# Patient Record
Sex: MALE | NOT HISPANIC OR LATINO | Employment: OTHER | URBAN - NONMETROPOLITAN AREA
[De-identification: names, ages, dates, MRNs, and addresses within clinical notes are randomized per-mention and may not be internally consistent; named-entity substitution may affect disease eponyms.]

---
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
## 2019-06-03 ENCOUNTER — HOSPITAL ENCOUNTER (EMERGENCY)
Facility: HOSPITAL | Age: 19
Discharge: HOME | End: 2019-06-03
Attending: EMERGENCY MEDICINE | Admitting: EMERGENCY MEDICINE

## 2019-06-03 VITALS
RESPIRATION RATE: 19 BRPM | SYSTOLIC BLOOD PRESSURE: 144 MMHG | DIASTOLIC BLOOD PRESSURE: 61 MMHG | TEMPERATURE: 98.2 F | OXYGEN SATURATION: 99 % | HEIGHT: 63 IN | BODY MASS INDEX: 30.83 KG/M2 | HEART RATE: 75 BPM | WEIGHT: 174 LBS

## 2019-06-03 DIAGNOSIS — Z20.2 POTENTIAL EXPOSURE TO STD: Primary | ICD-10-CM

## 2019-06-03 LAB
BILIRUB UR QL STRIP: NEGATIVE
C TRACH RRNA CVX QL NAA+PROBE: NOT DETECTED
CLARITY UR: CLEAR
COLOR UR: YELLOW
GLUCOSE UR STRIP-MCNC: NEGATIVE MG/DL
HAV IGM SERPL QL IA: NORMAL
HBV CORE IGM SERPL QL IA: NORMAL
HBV SURFACE AG SERPL QL IA: NORMAL
HCV AB SER DONR QL: NORMAL
HGB UR QL STRIP.AUTO: NEGATIVE
HIV1+2 AB SER QL: NORMAL
KETONES UR QL STRIP: NEGATIVE
LEUKOCYTE ESTERASE UR QL STRIP.AUTO: NEGATIVE
N GONORRHOEA RRNA SPEC QL NAA+PROBE: NOT DETECTED
NITRITE UR QL STRIP: NEGATIVE
PH UR STRIP.AUTO: 6 [PH] (ref 5–8)
PROT UR QL STRIP: NEGATIVE
SP GR UR STRIP: 1.02 (ref 1–1.03)
UROBILINOGEN UR QL STRIP: NORMAL

## 2019-06-03 PROCEDURE — 87255 GENET VIRUS ISOLATE HSV: CPT | Performed by: PHYSICIAN ASSISTANT

## 2019-06-03 PROCEDURE — 96372 THER/PROPH/DIAG INJ SC/IM: CPT

## 2019-06-03 PROCEDURE — 25010000002 CEFTRIAXONE PER 250 MG: Performed by: PHYSICIAN ASSISTANT

## 2019-06-03 PROCEDURE — 80074 ACUTE HEPATITIS PANEL: CPT | Performed by: PHYSICIAN ASSISTANT

## 2019-06-03 PROCEDURE — 36415 COLL VENOUS BLD VENIPUNCTURE: CPT

## 2019-06-03 PROCEDURE — 87591 N.GONORRHOEAE DNA AMP PROB: CPT | Performed by: PHYSICIAN ASSISTANT

## 2019-06-03 PROCEDURE — 99283 EMERGENCY DEPT VISIT LOW MDM: CPT

## 2019-06-03 PROCEDURE — G0432 EIA HIV-1/HIV-2 SCREEN: HCPCS | Performed by: PHYSICIAN ASSISTANT

## 2019-06-03 PROCEDURE — 81003 URINALYSIS AUTO W/O SCOPE: CPT | Performed by: PHYSICIAN ASSISTANT

## 2019-06-03 PROCEDURE — 87491 CHLMYD TRACH DNA AMP PROBE: CPT | Performed by: PHYSICIAN ASSISTANT

## 2019-06-03 RX ORDER — CEFTRIAXONE 1 G/1
1000 INJECTION, POWDER, FOR SOLUTION INTRAMUSCULAR; INTRAVENOUS ONCE
Status: COMPLETED | OUTPATIENT
Start: 2019-06-03 | End: 2019-06-03

## 2019-06-03 RX ORDER — LIDOCAINE HYDROCHLORIDE 10 MG/ML
2.1 INJECTION, SOLUTION EPIDURAL; INFILTRATION; INTRACAUDAL; PERINEURAL ONCE
Status: COMPLETED | OUTPATIENT
Start: 2019-06-03 | End: 2019-06-03

## 2019-06-03 RX ORDER — AZITHROMYCIN 250 MG/1
1000 TABLET, FILM COATED ORAL ONCE
Status: COMPLETED | OUTPATIENT
Start: 2019-06-03 | End: 2019-06-03

## 2019-06-03 RX ADMIN — AZITHROMYCIN 1000 MG: 250 TABLET, FILM COATED ORAL at 19:05

## 2019-06-03 RX ADMIN — CEFTRIAXONE 1000 MG: 1 INJECTION, POWDER, FOR SOLUTION INTRAMUSCULAR; INTRAVENOUS at 19:05

## 2019-06-03 RX ADMIN — LIDOCAINE HYDROCHLORIDE 2.1 ML: 10 INJECTION, SOLUTION EPIDURAL; INFILTRATION; INTRACAUDAL; PERINEURAL at 19:06

## 2019-06-03 NOTE — DISCHARGE INSTRUCTIONS
Call one of the offices below to establish a primary care provider.  If you are unable to get an appointment and feel it is an emergency and need to be seen immediately please return to the Emergency Department.    Call one of the office below to set up a primary care provider.    Dr. Lopez Arnold                                                                                                       [address] [facility] [address]  [phone]    Dr. Do, Dr. KAILEY Reeves, Dr. NATASHA Reeves ([facility])  [address] [facility] [address]  [phone]    Dr. Locke, Dr. Restrepo, Dr. Jean (Critical access hospital)  [address] [facility] [address]  [phone]    Dr. Hernandez  [address] [facility] [address]  [phone]    Dr. Lazaro, Dr. North, Dr. Mendez, Dr. Molina ([facility])  [address] [place] [address] [address]  [facility] [address]  [phone]    Dr. Luci Kat  [address] [facility] [place] [address]  [phone]    Dr. Marnie Glynn  [address]   [address]  [facility] [address]  [phone]    Dr. Arnold  [address] [facility] [address]  [phone]    Dr. Gentile, Dr. Hernandez  [address] [facility] [place] [address]  [phone]    Dr. Uribe  [address]                                                              [address]  [facility] [address]  [phone]    Dr. Whitfield  [address] [facility] [address] [phone]    Dr. Jaylin Rodrigues  [address]  [address]  [facility] [address]  [phone]

## 2019-06-03 NOTE — ED PROVIDER NOTES
"Subjective   18-year-old male presents to the ER requesting to be checked for \"everything\" STD related.  He is currently asymptomatic, he has no dysuria, penile discharge, genital lesions, abdominal pain, fever, chills.  He states he is with his girlfriend, and has not been with anybody else.  He states he has had hepatitis A in the past, but denies any other past history of hepatitis he states he brought his son in to be seen for URI symptoms, and wanted to have blood work done while he was here.        History provided by:  Patient   used: No    Illness   Severity:  Mild  Onset quality:  Unable to specify  Timing:  Unable to specify  Progression:  Unable to specify  Associated symptoms: no abdominal pain, no chest pain, no congestion, no cough, no diarrhea, no fever, no headaches, no myalgias, no nausea, no rash, no rhinorrhea, no shortness of breath, no sore throat, no vomiting and no wheezing        Review of Systems   Constitutional: Negative for activity change and fever.   HENT: Negative for congestion, rhinorrhea and sore throat.    Eyes: Negative for pain and redness.   Respiratory: Negative for cough, shortness of breath and wheezing.    Cardiovascular: Negative for chest pain.   Gastrointestinal: Negative for abdominal distention, abdominal pain, diarrhea, nausea and vomiting.   Endocrine: Negative for polyphagia and polyuria.   Genitourinary: Negative for decreased urine volume, difficulty urinating and dysuria.   Musculoskeletal: Negative for arthralgias, myalgias and neck pain.   Skin: Negative for rash and wound.   Allergic/Immunologic: Negative for food allergies and immunocompromised state.   Neurological: Negative for dizziness and headaches.   Hematological: Negative for adenopathy. Does not bruise/bleed easily.   Psychiatric/Behavioral: Negative for agitation and confusion.   All other systems reviewed and are negative.      Past Medical History:   Diagnosis Date   • ADHD    • " "Learning disability    • Seizures (CMS/HCC)     last seizure \"a long time ago, years.\"   • Self-injurious behavior     \"I tried to cut myself for attention when I was a teenager.\"       No Known Allergies    Past Surgical History:   Procedure Laterality Date   • NO PAST SURGERIES         Family History   Family history unknown: Yes       Social History     Socioeconomic History   • Marital status: Single     Spouse name: Not on file   • Number of children: Not on file   • Years of education: Not on file   • Highest education level: Not on file   Tobacco Use   • Smoking status: Current Every Day Smoker     Packs/day: 1.00     Years: 0.50     Pack years: 0.50     Types: Cigarettes   • Smokeless tobacco: Former User   Substance and Sexual Activity   • Alcohol use: No   • Drug use: No   • Sexual activity: Yes     Partners: Female     Birth control/protection: None           Objective   Physical Exam   Constitutional: He is oriented to person, place, and time. He appears well-developed and well-nourished.   HENT:   Head: Normocephalic and atraumatic.   Eyes: EOM are normal. Pupils are equal, round, and reactive to light.   Neck: Normal range of motion. Neck supple.   Cardiovascular: Normal rate, regular rhythm and normal heart sounds.   Pulmonary/Chest: Effort normal and breath sounds normal.   Abdominal: Soft. Bowel sounds are normal.   Genitourinary: Testes normal and penis normal. Right testis shows no tenderness. Left testis shows no tenderness. No penile erythema or penile tenderness. No discharge found.   Musculoskeletal: Normal range of motion.   Neurological: He is alert and oriented to person, place, and time.   Skin: Skin is warm and dry.   Psychiatric: He has a normal mood and affect. His behavior is normal. Judgment and thought content normal.   Nursing note and vitals reviewed.      Procedures           ED Course  ED Course as of Jun 03 1925 Mon Jun 03, 2019   1808 Patient states he wants tested for " "\"everything\".  He states he is completely asymptomatic at this time, but is still requesting to be tested for gonorrhea, chlamydia, herpes, hepatitis, HIV.  I will prophylactically treat patient for gonorrhea and chlamydia, he will have to call back up here for his test results, and he is aware of this.  [ABRAM]      ED Course User Index  [BARAM] Quinton Hess PA                  MDM  Number of Diagnoses or Management Options     Amount and/or Complexity of Data Reviewed  Clinical lab tests: ordered and reviewed  Tests in the radiology section of CPT®: reviewed and ordered  Tests in the medicine section of CPT®: reviewed and ordered    Patient Progress  Patient progress: stable        Final diagnoses:   Potential exposure to STD            Quinton Hess PA  06/03/19 1925    "

## 2019-06-06 LAB — HSV SPEC CULT: NEGATIVE

## 2019-07-31 ENCOUNTER — APPOINTMENT (OUTPATIENT)
Dept: CT IMAGING | Facility: HOSPITAL | Age: 19
End: 2019-07-31

## 2019-07-31 ENCOUNTER — HOSPITAL ENCOUNTER (EMERGENCY)
Facility: HOSPITAL | Age: 19
Discharge: ANOTHER HOSPITAL | End: 2019-08-01
Attending: FAMILY MEDICINE | Admitting: EMERGENCY MEDICINE

## 2019-07-31 DIAGNOSIS — G40.901 STATUS EPILEPTICUS (HCC): Primary | ICD-10-CM

## 2019-07-31 LAB
6-ACETYL MORPHINE: NEGATIVE
ALBUMIN SERPL-MCNC: 4.4 G/DL (ref 3.5–5.2)
ALBUMIN/GLOB SERPL: 1.7 G/DL
ALP SERPL-CCNC: 85 U/L (ref 39–117)
ALT SERPL W P-5'-P-CCNC: 49 U/L (ref 1–41)
AMPHET+METHAMPHET UR QL: NEGATIVE
ANION GAP SERPL CALCULATED.3IONS-SCNC: 10.7 MMOL/L (ref 5–15)
APAP SERPL-MCNC: <5 MCG/ML (ref 10–30)
AST SERPL-CCNC: 34 U/L (ref 1–40)
BARBITURATES UR QL SCN: NEGATIVE
BASOPHILS # BLD AUTO: 0.01 10*3/MM3 (ref 0–0.2)
BASOPHILS NFR BLD AUTO: 0.1 % (ref 0–1.5)
BENZODIAZ UR QL SCN: NEGATIVE
BILIRUB SERPL-MCNC: 0.4 MG/DL (ref 0.2–1.2)
BUN BLD-MCNC: 14 MG/DL (ref 6–20)
BUN/CREAT SERPL: 20.9 (ref 7–25)
BUPRENORPHINE SERPL-MCNC: NEGATIVE NG/ML
CALCIUM SPEC-SCNC: 9.3 MG/DL (ref 8.6–10.5)
CANNABINOIDS SERPL QL: NEGATIVE
CHLORIDE SERPL-SCNC: 104 MMOL/L (ref 98–107)
CK SERPL-CCNC: 245 U/L (ref 20–200)
CO2 SERPL-SCNC: 25.3 MMOL/L (ref 22–29)
COCAINE UR QL: NEGATIVE
CREAT BLD-MCNC: 0.67 MG/DL (ref 0.76–1.27)
D-LACTATE SERPL-SCNC: 1 MMOL/L (ref 0.5–2)
DEPRECATED RDW RBC AUTO: 39 FL (ref 37–54)
EOSINOPHIL # BLD AUTO: 0.26 10*3/MM3 (ref 0–0.4)
EOSINOPHIL NFR BLD AUTO: 3.2 % (ref 0.3–6.2)
ERYTHROCYTE [DISTWIDTH] IN BLOOD BY AUTOMATED COUNT: 12.3 % (ref 12.3–15.4)
ETHANOL BLD-MCNC: <10 MG/DL (ref 0–10)
ETHANOL UR QL: <0.01 %
GFR SERPL CREATININE-BSD FRML MDRD: >150 ML/MIN/1.73
GLOBULIN UR ELPH-MCNC: 2.6 GM/DL
GLUCOSE BLD-MCNC: 110 MG/DL (ref 65–99)
GLUCOSE BLDC GLUCOMTR-MCNC: 115 MG/DL (ref 70–130)
HCT VFR BLD AUTO: 42.1 % (ref 37.5–51)
HGB BLD-MCNC: 14.6 G/DL (ref 13–17.7)
HOLD SPECIMEN: NORMAL
HOLD SPECIMEN: NORMAL
IMM GRANULOCYTES # BLD AUTO: 0.01 10*3/MM3 (ref 0–0.05)
IMM GRANULOCYTES NFR BLD AUTO: 0.1 % (ref 0–0.5)
INR PPP: 1.11 (ref 0.9–1.1)
LYMPHOCYTES # BLD AUTO: 2.62 10*3/MM3 (ref 0.7–3.1)
LYMPHOCYTES NFR BLD AUTO: 32.6 % (ref 19.6–45.3)
MCH RBC QN AUTO: 30.4 PG (ref 26.6–33)
MCHC RBC AUTO-ENTMCNC: 34.7 G/DL (ref 31.5–35.7)
MCV RBC AUTO: 87.7 FL (ref 79–97)
METHADONE UR QL SCN: NEGATIVE
MONOCYTES # BLD AUTO: 0.92 10*3/MM3 (ref 0.1–0.9)
MONOCYTES NFR BLD AUTO: 11.5 % (ref 5–12)
MYOGLOBIN SERPL-MCNC: 25.6 NG/ML (ref 28–72)
NEUTROPHILS # BLD AUTO: 4.21 10*3/MM3 (ref 1.7–7)
NEUTROPHILS NFR BLD AUTO: 52.5 % (ref 42.7–76)
OPIATES UR QL: NEGATIVE
OXYCODONE UR QL SCN: NEGATIVE
PCP UR QL SCN: NEGATIVE
PLATELET # BLD AUTO: 238 10*3/MM3 (ref 140–450)
PMV BLD AUTO: 9.5 FL (ref 6–12)
POTASSIUM BLD-SCNC: 3.7 MMOL/L (ref 3.5–5.2)
PROT SERPL-MCNC: 7 G/DL (ref 6–8.5)
PROTHROMBIN TIME: 14.9 SECONDS (ref 11–15.4)
RBC # BLD AUTO: 4.8 10*6/MM3 (ref 4.14–5.8)
SALICYLATES SERPL-MCNC: <0.3 MG/DL
SODIUM BLD-SCNC: 140 MMOL/L (ref 136–145)
WBC NRBC COR # BLD: 8.03 10*3/MM3 (ref 3.4–10.8)
WHOLE BLOOD HOLD SPECIMEN: NORMAL
WHOLE BLOOD HOLD SPECIMEN: NORMAL

## 2019-07-31 PROCEDURE — 96374 THER/PROPH/DIAG INJ IV PUSH: CPT

## 2019-07-31 PROCEDURE — 80307 DRUG TEST PRSMV CHEM ANLYZR: CPT | Performed by: FAMILY MEDICINE

## 2019-07-31 PROCEDURE — 25010000002 LEVETRIRACETAM PER 10 MG: Performed by: FAMILY MEDICINE

## 2019-07-31 PROCEDURE — 83605 ASSAY OF LACTIC ACID: CPT | Performed by: FAMILY MEDICINE

## 2019-07-31 PROCEDURE — 83874 ASSAY OF MYOGLOBIN: CPT | Performed by: FAMILY MEDICINE

## 2019-07-31 PROCEDURE — 82550 ASSAY OF CK (CPK): CPT | Performed by: FAMILY MEDICINE

## 2019-07-31 PROCEDURE — 80053 COMPREHEN METABOLIC PANEL: CPT | Performed by: FAMILY MEDICINE

## 2019-07-31 PROCEDURE — 85025 COMPLETE CBC W/AUTO DIFF WBC: CPT | Performed by: FAMILY MEDICINE

## 2019-07-31 PROCEDURE — 82962 GLUCOSE BLOOD TEST: CPT

## 2019-07-31 PROCEDURE — 85610 PROTHROMBIN TIME: CPT | Performed by: FAMILY MEDICINE

## 2019-07-31 PROCEDURE — 70450 CT HEAD/BRAIN W/O DYE: CPT | Performed by: RADIOLOGY

## 2019-07-31 PROCEDURE — 80177 DRUG SCRN QUAN LEVETIRACETAM: CPT | Performed by: FAMILY MEDICINE

## 2019-07-31 PROCEDURE — 99284 EMERGENCY DEPT VISIT MOD MDM: CPT

## 2019-07-31 PROCEDURE — 70450 CT HEAD/BRAIN W/O DYE: CPT

## 2019-07-31 RX ORDER — SODIUM CHLORIDE 0.9 % (FLUSH) 0.9 %
10 SYRINGE (ML) INJECTION AS NEEDED
Status: DISCONTINUED | OUTPATIENT
Start: 2019-07-31 | End: 2019-08-01 | Stop reason: HOSPADM

## 2019-07-31 RX ADMIN — SODIUM CHLORIDE 1000 MG: 9 INJECTION, SOLUTION INTRAVENOUS at 22:20

## 2019-08-01 VITALS
TEMPERATURE: 98.3 F | SYSTOLIC BLOOD PRESSURE: 129 MMHG | HEART RATE: 98 BPM | DIASTOLIC BLOOD PRESSURE: 73 MMHG | OXYGEN SATURATION: 97 % | HEIGHT: 66 IN | RESPIRATION RATE: 18 BRPM | WEIGHT: 165 LBS | BODY MASS INDEX: 26.52 KG/M2

## 2019-08-01 NOTE — ED NOTES
I called [facility] to request transport to [facility]. They are going to contact [facility] and call me back.     Edith Edwards  08/01/19 0143

## 2019-08-01 NOTE — ED NOTES
Called radiology and requested a disc from [place] for transfer.      Edith Edwards  08/01/19 0122

## 2019-08-01 NOTE — ED NOTES
Called [facility] to give report, Nurse was in an isolation room and will return call soon.      Paige Hansen, RN  08/01/19 0145

## 2019-08-01 NOTE — ED NOTES
Called Christine at [facility] and she is paging Dr Boyd, nuerologist at [facility] for Dr Vital.     Edith Edwards  07/31/19 7814

## 2019-08-01 NOTE — ED NOTES
Called Christine back at [facility] to see if they reached Dr Boyd. She said they have made several attempts and cannot reach him.      Edith Edwards  08/01/19 0029

## 2019-08-01 NOTE — ED PROVIDER NOTES
"Subjective   19-year-old male with a history of seizures presents the emergency room with seizure activity that occurred just prior to arrival.  Per EMS patient was noted to have a seizure that lasted approximately 20 minutes per bystanders.  Patient had subsequent resolution of seizure activity.  Patient states that he is not followed by anyone for seizures.  He states that he is on Keppra but reports he has not taken medication in the past few days.  He is unsure when the exact time he took his Keppra last.  He denies fever chills headache nausea vomiting chest pain shortness of breath.  Patient states that he feels fatigued.  He denies any other symptoms at this time.        Seizures   Seizure type:  Grand mal  Episode characteristics: generalized shaking    Postictal symptoms: confusion    Duration:  20 minutes  Timing:  Once  Context: medical non-compliance    Context: not fever, not possible hypoglycemia, not previous head injury and not stress    Recent head injury:  No recent head injuries      Review of Systems   Constitutional: Positive for fatigue. Negative for diaphoresis and fever.   HENT: Negative for sore throat.    Respiratory: Negative for cough, shortness of breath and wheezing.    Cardiovascular: Negative for chest pain and palpitations.   Gastrointestinal: Negative for abdominal pain, diarrhea, nausea and vomiting.   Genitourinary: Negative for flank pain.   Musculoskeletal: Negative for arthralgias and myalgias.   Skin: Negative for rash.   Neurological: Positive for seizures. Negative for dizziness and headaches.   All other systems reviewed and are negative.      Past Medical History:   Diagnosis Date   • ADHD    • Learning disability    • Seizures (CMS/HCC)     last seizure \"a long time ago, years.\"   • Self-injurious behavior     \"I tried to cut myself for attention when I was a teenager.\"       No Known Allergies    Past Surgical History:   Procedure Laterality Date   • NO PAST SURGERIES   "       Family History   Family history unknown: Yes       Social History     Socioeconomic History   • Marital status: Single     Spouse name: Not on file   • Number of children: Not on file   • Years of education: Not on file   • Highest education level: Not on file   Tobacco Use   • Smoking status: Current Every Day Smoker     Packs/day: 1.00     Years: 0.50     Pack years: 0.50     Types: Cigarettes   • Smokeless tobacco: Former User   Substance and Sexual Activity   • Alcohol use: No   • Drug use: No   • Sexual activity: Yes     Partners: Female     Birth control/protection: None           Objective   Physical Exam   Constitutional: He is oriented to person, place, and time.   Eyes: Conjunctivae, EOM and lids are normal. Right eye exhibits no nystagmus. Left eye exhibits no nystagmus.   Neck: Normal range of motion. Neck supple. No JVD present.   No meningismus   Cardiovascular:   Tachycardic.   2+ radial pulses 2+ fast pedis pulses 2+ posterior tibialis pulse bilaterally no extra systoles   Pulmonary/Chest: Effort normal and breath sounds normal. He has no wheezes. He has no rales.   No accessory muscle use.   Abdominal: Soft. Bowel sounds are normal. There is no tenderness. There is no rebound and no guarding.   Musculoskeletal: Normal range of motion. He exhibits no edema or tenderness.   Neurological: He is alert and oriented to person, place, and time. He has normal strength. No cranial nerve deficit or sensory deficit.   Reflex Scores:       Patellar reflexes are 2+ on the right side and 2+ on the left side.       Achilles reflexes are 2+ on the right side and 2+ on the left side.  Fine resting tremor.   Skin: Skin is warm. He is not diaphoretic. No pallor.   Psychiatric: He has a normal mood and affect.       Procedures           ED Course  ED Course as of Aug 05 1256   Wed Jul 31, 2019   2208 Patient was noted to have reported history of seizures.  Patient has not had any seizure activity while in the  emergency room.  Patient does report having 20 minutes of seizure activity prior to coming to the emergency room patient reports he has missed medications recently he is unsure when his last dose of Keppra was given.  Have given IV Keppra 1 g.  [BB]   2247 Patient without seizure activity during emergency room stay.  Patient's wife does state that patient has been without his seizure medication for 3 months.  She states that patient seizure today was a generalized shaking.  Episode lasted 15 to 20 minutes.  [BB]   2343 Patient CT scan of head is unremarkable.  Patient noted to be oriented x3.  Patient alert and awake.  Tremor has resolved.  Patient case discussed with Dr. Hamilton at [facility] who states that given new onset seizure and length of duration patient would likely need EEG.  Have therefore called [facility] and awaiting callback.  [BB]   Thu Aug 01, 2019   0029 Have contacted [facility] who states that patient be placed on a wait list.  Have contacted [facility] awaiting callback.  Have contacted [facility] who states no neurology coverage.  We will continue to monitor patient.  [BB]   0041 Have spoken to neurologist at  who is unable to accept transfer due to being on diversion.  [BB]   0042 Have discussed case with Dr. PERRY who has assumed care  [BB]   0102 D/W  Dr Boyd accepting to see in consult, will have hospitalist consult us for transfer  [TZ]      ED Course User Index  [BB] Teo Vital MD  [TZ] Austen Jackson MD                  [facility]        Final diagnoses:   Status epilepticus (CMS/[facility])            Teo Vital MD  08/05/19 1549

## 2019-08-01 NOTE — ED NOTES
On hold with Christine from [facility]. She is getting the neurologist at [facility] for Dr. Vital.     Edith Edwards  07/31/19 2744

## 2019-08-01 NOTE — ED NOTES
Called [facility] and spoke to Angelica. She said patient would have to go on a waiting list.      Edith Edwards  07/31/19 0339

## 2019-08-01 NOTE — ED NOTES
Bedside report given to [facility] at this time. Patient is A/O x4, leaving with 20 gauge IV in place in left hand, respirations even/unlabored. VSS/NADN. EMTALA and other discharge documentation given to EMS. Patient belongings with patient's wife.      Paige Hansen RN  08/01/19 6187

## 2019-08-04 LAB — LEVETIRACETAM SERPL-MCNC: NORMAL UG/ML (ref 10–40)

## 2021-03-16 ENCOUNTER — BULK ORDERING (OUTPATIENT)
Dept: CASE MANAGEMENT | Facility: OTHER | Age: 21
End: 2021-03-16

## 2021-03-16 DIAGNOSIS — Z23 IMMUNIZATION DUE: ICD-10-CM

## 2022-08-06 ENCOUNTER — APPOINTMENT (OUTPATIENT)
Dept: CT IMAGING | Facility: HOSPITAL | Age: 22
End: 2022-08-06

## 2022-08-06 ENCOUNTER — HOSPITAL ENCOUNTER (EMERGENCY)
Facility: HOSPITAL | Age: 22
Discharge: HOME | End: 2022-08-06
Attending: EMERGENCY MEDICINE | Admitting: STUDENT IN AN ORGANIZED HEALTH CARE EDUCATION/TRAINING PROGRAM

## 2022-08-06 VITALS
OXYGEN SATURATION: 98 % | HEIGHT: 63 IN | RESPIRATION RATE: 18 BRPM | BODY MASS INDEX: 31.89 KG/M2 | WEIGHT: 180 LBS | HEART RATE: 87 BPM | DIASTOLIC BLOOD PRESSURE: 70 MMHG | TEMPERATURE: 98 F | SYSTOLIC BLOOD PRESSURE: 137 MMHG

## 2022-08-06 DIAGNOSIS — S29.019A STRAIN OF THORACIC REGION, INITIAL ENCOUNTER: ICD-10-CM

## 2022-08-06 DIAGNOSIS — S16.1XXA STRAIN OF NECK MUSCLE, INITIAL ENCOUNTER: Primary | ICD-10-CM

## 2022-08-06 PROCEDURE — 72128 CT CHEST SPINE W/O DYE: CPT

## 2022-08-06 PROCEDURE — 72125 CT NECK SPINE W/O DYE: CPT

## 2022-08-06 PROCEDURE — 72131 CT LUMBAR SPINE W/O DYE: CPT

## 2022-08-06 PROCEDURE — 99282 EMERGENCY DEPT VISIT SF MDM: CPT
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
## 2023-05-06 ENCOUNTER — HOSPITAL ENCOUNTER (EMERGENCY)
Facility: HOSPITAL | Age: 23
Discharge: HOME | End: 2023-05-06
Attending: STUDENT IN AN ORGANIZED HEALTH CARE EDUCATION/TRAINING PROGRAM
Payer: COMMERCIAL

## 2023-05-06 VITALS
RESPIRATION RATE: 18 BRPM | OXYGEN SATURATION: 95 % | WEIGHT: 200 LBS | DIASTOLIC BLOOD PRESSURE: 64 MMHG | BODY MASS INDEX: 35.44 KG/M2 | TEMPERATURE: 98 F | HEIGHT: 63 IN | HEART RATE: 92 BPM | SYSTOLIC BLOOD PRESSURE: 120 MMHG

## 2023-05-06 DIAGNOSIS — S61.012A LACERATION OF LEFT THUMB WITHOUT FOREIGN BODY WITHOUT DAMAGE TO NAIL, INITIAL ENCOUNTER: Primary | ICD-10-CM

## 2023-05-06 PROCEDURE — 99282 EMERGENCY DEPT VISIT SF MDM: CPT

## 2023-05-06 PROCEDURE — 25010000002 TETANUS-DIPHTH-ACELL PERTUSSIS 5-2.5-18.5 LF-MCG/0.5 SUSPENSION PREFILLED SYRINGE: Performed by: PHYSICIAN ASSISTANT

## 2023-05-06 PROCEDURE — 90471 IMMUNIZATION ADMIN: CPT | Performed by: PHYSICIAN ASSISTANT

## 2023-05-06 PROCEDURE — 90715 TDAP VACCINE 7 YRS/> IM: CPT | Performed by: PHYSICIAN ASSISTANT

## 2023-05-06 RX ORDER — IBUPROFEN 800 MG/1
800 TABLET ORAL EVERY 6 HOURS PRN
Qty: 30 TABLET | Refills: 0 | Status: SHIPPED | OUTPATIENT
Start: 2023-05-06

## 2023-05-06 RX ORDER — LIDOCAINE HYDROCHLORIDE 10 MG/ML
10 INJECTION, SOLUTION EPIDURAL; INFILTRATION; INTRACAUDAL; PERINEURAL ONCE
Status: COMPLETED | OUTPATIENT
Start: 2023-05-06 | End: 2023-05-06

## 2023-05-06 RX ORDER — CEPHALEXIN 500 MG/1
500 CAPSULE ORAL 2 TIMES DAILY
Qty: 20 CAPSULE | Refills: 0 | Status: SHIPPED | OUTPATIENT
Start: 2023-05-06

## 2023-05-06 RX ADMIN — TETANUS TOXOID, REDUCED DIPHTHERIA TOXOID AND ACELLULAR PERTUSSIS VACCINE, ADSORBED 0.5 ML: 5; 2.5; 8; 8; 2.5 SUSPENSION INTRAMUSCULAR at 22:30

## 2023-05-06 RX ADMIN — LIDOCAINE HYDROCHLORIDE 10 ML: 10 INJECTION, SOLUTION EPIDURAL; INFILTRATION; INTRACAUDAL; PERINEURAL at 22:30

## 2023-05-07 NOTE — ED PROVIDER NOTES
"Subjective     History provided by:  Patient  Laceration  Location:  Finger  Finger laceration location:  L thumb  Length:  3cm  Depth:  Through dermis  Bleeding: controlled    Time since incident:  4 hours  Laceration mechanism:  Metal edge  Pain details:     Quality:  Aching    Severity:  Mild  Foreign body present:  No foreign bodies  Relieved by:  Nothing  Tetanus status:  Out of date  Associated symptoms: no fever        Review of Systems   Constitutional: Negative.  Negative for fever.   HENT: Negative.    Respiratory: Negative.    Cardiovascular: Negative.  Negative for chest pain.   Gastrointestinal: Negative.  Negative for abdominal pain.   Endocrine: Negative.    Genitourinary: Negative.  Negative for dysuria.   Skin: Positive for wound.   Neurological: Negative.    Psychiatric/Behavioral: Negative.    All other systems reviewed and are negative.      Past Medical History:   Diagnosis Date   • ADHD    • Learning disability    • Seizures     last seizure \"a long time ago, years.\"   • Self-injurious behavior     \"I tried to cut myself for attention when I was a teenager.\"       No Known Allergies    Past Surgical History:   Procedure Laterality Date   • NO PAST SURGERIES         Family History   Family history unknown: Yes       Social History     Socioeconomic History   • Marital status: Single   Tobacco Use   • Smoking status: Every Day     Packs/day: 1.00     Years: 0.50     Pack years: 0.50     Types: Cigarettes   • Smokeless tobacco: Former   Substance and Sexual Activity   • Alcohol use: No   • Drug use: No   • Sexual activity: Yes     Partners: Female     Birth control/protection: None           Objective   Physical Exam  Vitals and nursing note reviewed.   Constitutional:       General: He is not in acute distress.     Appearance: He is well-developed. He is not diaphoretic.   HENT:      Head: Normocephalic and atraumatic.      Right Ear: External ear normal.      Left Ear: External ear normal.      " Nose: Nose normal.   Eyes:      Conjunctiva/sclera: Conjunctivae normal.   Neck:      Vascular: No JVD.      Trachea: No tracheal deviation.   Cardiovascular:      Rate and Rhythm: Normal rate.      Heart sounds: No murmur heard.  Pulmonary:      Effort: Pulmonary effort is normal. No respiratory distress.      Breath sounds: No wheezing.   Abdominal:      Palpations: Abdomen is soft.      Tenderness: There is no abdominal tenderness.   Musculoskeletal:         General: No deformity. Normal range of motion.      Cervical back: Normal range of motion and neck supple.   Skin:     General: Skin is warm and dry.      Coloration: Skin is not pale.      Findings: No erythema or rash.      Comments: Gaping laceration of the left thumb.  Bleeding controlled.    Neurological:      Mental Status: He is alert and oriented to person, place, and time.      Cranial Nerves: No cranial nerve deficit.   Psychiatric:         Behavior: Behavior normal.         Thought Content: Thought content normal.         Laceration Repair    Date/Time: 5/6/2023 10:46 PM  Performed by: Patrice Salazar II, PA  Authorized by: Afshan Weeks DO     Consent:     Consent obtained:  Verbal    Consent given by:  Patient    Risks discussed:  Infection and pain  Universal protocol:     Patient identity confirmed:  Verbally with patient  Anesthesia:     Anesthesia method:  Local infiltration    Local anesthetic:  Lidocaine 1% w/o epi  Laceration details:     Location:  Finger    Finger location:  L thumb    Length (cm):  5  Pre-procedure details:     Preparation:  Patient was prepped and draped in usual sterile fashion  Exploration:     Limited defect created (wound extended): no      Hemostasis achieved with:  Direct pressure    Wound exploration: wound explored through full range of motion      Contaminated: no    Treatment:     Area cleansed with:  Shur-Clens and soap and water    Amount of cleaning:  Standard    Irrigation solution:  Sterile saline     Visualized foreign bodies/material removed: no      Debridement:  None  Skin repair:     Repair method:  Sutures    Suture size:  3-0    Suture material:  Nylon    Suture technique:  Simple interrupted    Number of sutures:  5  Approximation:     Approximation:  Close  Repair type:     Repair type:  Simple  Post-procedure details:     Dressing:  Non-adherent dressing    Procedure completion:  Tolerated well, no immediate complications               ED Course                                           Medical Decision Making  22-year-old with laceration left thumb caused by metal edge on a broom.    Laceration of left thumb without foreign body without damage to nail, initial encounter: acute illness or injury     Details: 5 cm laceration of the left thumb.  This was repaired with three-point 0 nylon sutures.  Tetanus was updated.  Patient be started on a regimen of Keflex and ibuprofen.  Remove sutures in 7 to 10 days.  Risk  Prescription drug management.          Final diagnoses:   Laceration of left thumb without foreign body without damage to nail, initial encounter       ED Disposition  ED Disposition     ED Disposition   Discharge    Condition   Stable    Comment   --             PATIENT CONNECTION - [place]  See Provider List  [place] [id number]  [phone]  Schedule an appointment as soon as possible for a visit            Medication List      New Prescriptions    cephalexin 500 MG capsule  Commonly known as: KEFLEX  Take 1 capsule by mouth 2 (Two) Times a Day.     ibuprofen 800 MG tablet  Commonly known as: ADVIL,MOTRIN  Take 1 tablet by mouth Every 6 (Six) Hours As Needed for Moderate Pain.           Where to Get Your Medications      These medications were sent to [facility] [address] [place] [address] - [phone] [place] [id number] [place] [address]    Phone: [phone]   · cephalexin 500 MG capsule  · ibuprofen 800 MG tablet          Patrice Salazar II,  PA  05/06/23 1771

## 2024-09-18 ENCOUNTER — OFFICE VISIT (OUTPATIENT)
Dept: PSYCHIATRY | Facility: CLINIC | Age: 24
End: 2024-09-18
Payer: COMMERCIAL

## 2024-09-18 VITALS
SYSTOLIC BLOOD PRESSURE: 140 MMHG | HEIGHT: 63 IN | HEART RATE: 105 BPM | WEIGHT: 214.6 LBS | OXYGEN SATURATION: 98 % | BODY MASS INDEX: 38.02 KG/M2 | DIASTOLIC BLOOD PRESSURE: 78 MMHG

## 2024-09-18 DIAGNOSIS — Z13.39 ADHD (ATTENTION DEFICIT HYPERACTIVITY DISORDER) EVALUATION: Primary | ICD-10-CM

## 2024-09-18 DIAGNOSIS — Z79.899 MEDICATION MANAGEMENT: ICD-10-CM

## 2024-09-18 LAB
EXTERNAL AMPHETAMINE SCREEN URINE: NEGATIVE
EXTERNAL BENZODIAZEPINE SCREEN URINE: NEGATIVE
EXTERNAL BUPRENORPHINE SCREEN URINE: NEGATIVE
EXTERNAL COCAINE SCREEN URINE: NEGATIVE
EXTERNAL MDMA: NEGATIVE
EXTERNAL METHADONE SCREEN URINE: NEGATIVE
EXTERNAL METHAMPHETAMINE SCREEN URINE: NEGATIVE
EXTERNAL OPIATES SCREEN URINE: NEGATIVE
EXTERNAL OXYCODONE SCREEN URINE: NEGATIVE
EXTERNAL THC SCREEN URINE: NEGATIVE

## 2024-09-18 PROCEDURE — 1160F RVW MEDS BY RX/DR IN RCRD: CPT

## 2024-09-18 PROCEDURE — 1159F MED LIST DOCD IN RCRD: CPT

## 2024-09-18 PROCEDURE — 90792 PSYCH DIAG EVAL W/MED SRVCS: CPT

## 2024-10-16 ENCOUNTER — OFFICE VISIT (OUTPATIENT)
Dept: PSYCHIATRY | Facility: CLINIC | Age: 24
End: 2024-10-16
Payer: COMMERCIAL

## 2024-10-16 VITALS
DIASTOLIC BLOOD PRESSURE: 70 MMHG | BODY MASS INDEX: 38.09 KG/M2 | OXYGEN SATURATION: 98 % | HEIGHT: 63 IN | HEART RATE: 111 BPM | WEIGHT: 215 LBS | SYSTOLIC BLOOD PRESSURE: 122 MMHG

## 2024-10-16 DIAGNOSIS — F90.2 ADHD (ATTENTION DEFICIT HYPERACTIVITY DISORDER), COMBINED TYPE: Primary | ICD-10-CM

## 2024-10-16 PROCEDURE — 99214 OFFICE O/P EST MOD 30 MIN: CPT

## 2024-10-16 PROCEDURE — 1160F RVW MEDS BY RX/DR IN RCRD: CPT

## 2024-10-16 PROCEDURE — 1159F MED LIST DOCD IN RCRD: CPT

## 2024-10-16 RX ORDER — METHYLPHENIDATE HYDROCHLORIDE 27 MG/1
27 TABLET ORAL DAILY
Qty: 30 TABLET | Refills: 0 | Status: SHIPPED | OUTPATIENT
Start: 2024-10-16 | End: 2024-11-15

## 2024-10-16 NOTE — PROGRESS NOTES
"  Subjective     Teo Velazquez is a 24 y.o. male who presents today for follow up    Chief Complaint:  problems with focus and concentration     History of Present Illness:    Today is a follow-up visit.    Medication compliance: patient was not started on any medication until he could be evaluated for ADHD.  Patient reports he was diagnosed with ADHD as a child. He reports getting treatment at [facility] and started medication at age 2. He stopped going to [facility] when he was 16. He went to another doctor and got his ADHD medication. He stopped taking medication 20/21 years old when he moved to [place] and did not know how to find a provider.     Details:  Depression is rated at 0/10, with 10 being the worst. PHQ-9 score: 0   Patient denies having a depressed mood or anhedonia. less, and guilty.      Anxiety is rated at 0/10, with 10 being the worst.   He denies having excessive anxiety, excessive worry, or difficulty controlling worry.      Patient reports having problems with hyperactivity, getting in trouble at school, he reports problems with focus and concentration. 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 Easily distracted, requiring small class room to improve ability to learn. He reports difficulty completing tasks. He did not graduate HS. He has some one to help him read and understand.       He denies having anxiety attacks  He denies having a problem with anger or irritability. He reports when getting upset he will take a walk or play a video game.       Sleep is good, getting about 8 hours a night. He reports feeling rested and \"fully charged\" Nightmares: Denies     Appetite is good. He reports eating 3 meals and snacking when hungry. He reports drinking 1-2 sodas a day, he drinks donna-aid, water and milk.      Patient denies SI/HI, A/V hallucinations, or delusions.    Alcohol use: no  Drug use: no  Marijuana use: no  Tobacco:  Started smoking at age 16, he is smoking a little less than 2 " "ppd.    Chronic health issues, no acute physical or medical issues today.  He has an appointment with neurology/seizure doctor for evaluation to get back on medication.  He has been waiting to make an appointment for disability after getting Doctors lined out.     The following portions of the patient's history were reviewed and updated as appropriate: allergies, current medications, past family history, past medical history, past social history, past surgical history and problem list.    Previous psychiatric medications include:   Antidepressants: None  Antianxiety: None  Antipsychotics: None  Mood stabilizers: Lamotrigine- patient does not remember taking this medication  ADHD: Concerta and Methylphenidate (Ritaline)    Past Psychiatric History:  Dx with ADHD at 2/3 years old, started medication at age 2.   Outpatient treatment: [facility] and another place in [facility]   Diagnoses: ADHD, combined type. Depression    Past Medical History:  Past Medical History:   Diagnosis Date    ADHD     Hearing loss     Both ears    Learning disability     Seizures 2023    last seizure \"a long time ago, years.\"    Self-injurious behavior     \"I tried to cut myself for attention when I was a teenager.\"       Social History:  Social History     Socioeconomic History    Marital status: Single    Number of children: 3    Highest education level: 8th grade   Tobacco Use    Smoking status: Every Day     Current packs/day: 2.00     Average packs/day: 2.0 packs/day for 0.5 years (1.0 ttl pk-yrs)     Types: Cigarettes    Smokeless tobacco: Former   Vaping Use    Vaping status: Never Used   Substance and Sexual Activity    Alcohol use: No    Drug use: No    Sexual activity: Yes     Partners: Female     Birth control/protection: None       Family History:  Family History   Problem Relation Age of Onset    Seizures Father     Seizures Sister     ADD / ADHD Sister     ODD Sister     Seizures Brother     ODD Brother     ADD / ADHD Brother  "    Seizures Paternal Grandmother        Past Surgical History:  Past Surgical History:   Procedure Laterality Date    NO PAST SURGERIES         Problem List:  Patient Active Problem List   Diagnosis    Depression with suicidal ideation       Allergy:   No Known Allergies     Current Medications:   Current Outpatient Medications   Medication Sig Dispense Refill    methylphenidate (Concerta) 27 MG CR tablet Take 1 tablet by mouth Daily for 30 days 30 tablet 0     No current facility-administered medications for this visit.       Review of Symptoms:    Review of Systems   Constitutional: Negative.    HENT: Negative.     Eyes: Negative.    Respiratory: Negative.     Cardiovascular: Negative.    Gastrointestinal: Negative.    Endocrine: Negative.    Genitourinary: Negative.    Musculoskeletal: Negative.    Skin: Negative.    Allergic/Immunologic: Negative.    Neurological: Negative.    Hematological: Negative.    Psychiatric/Behavioral:  Positive for decreased concentration.        Objective     Physical Exam:   Physical Exam  Constitutional:       Appearance: Normal appearance.   Eyes:      Pupils: Pupils are equal, round, and reactive to light.   Cardiovascular:      Rate and Rhythm: Tachycardia present.   Pulmonary:      Effort: Pulmonary effort is normal.   Musculoskeletal:         General: Normal range of motion.      Cervical back: Normal range of motion.   Skin:     General: Skin is warm and dry.   Neurological:      General: No focal deficit present.      Mental Status: He is alert and oriented to person, place, and time.   Psychiatric:         Attention and Perception: Perception normal. He is inattentive.         Mood and Affect: Mood and affect normal.         Speech: Speech normal.         Behavior: Behavior is hyperactive. Behavior is cooperative.         Thought Content: Thought content normal.         Cognition and Memory: Cognition and memory normal.         Judgment: Judgment normal.         Vitals:  "manual radial pulse: 92 at 12:55  Blood pressure 122/70, pulse 111, height 160 cm (62.99\"), weight 97.5 kg (215 lb), SpO2 98%.   Body mass index is 38.1 kg/m².    Last 3 Blood Pressure and Pulse Readings:  BP Readings from Last 3 Encounters:   10/16/24 122/70   09/18/24 140/78   05/06/23 120/64     Pulse Readings from Last 3 Encounters:   10/16/24 111   09/18/24 105   05/06/23 92       PHQ-9 Score: October 16, 2024  PHQ-9 Depression Screening  Little interest or pleasure in doing things? Not at all   Feeling down, depressed, or hopeless? Not at all   PHQ-2 Total Score 0   Trouble falling or staying asleep, or sleeping too much? Not at all   Feeling tired or having little energy? Not at all   Poor appetite or overeating? Not at all   Feeling bad about yourself - or that you are a failure or have let yourself or your family down? Not at all   Trouble concentrating on things, such as reading the newspaper or watching television? Not at all   Moving or speaking so slowly that other people could have noticed? Or the opposite - being so fidgety or restless that you have been moving around a lot more than usual? Not at all   Thoughts that you would be better off dead, or of hurting yourself in some way? Not at all   PHQ-9 Total Score 0   If you checked off any problems, how difficult have these problems made it for you to do your work, take care of things at home, or get along with other people? Not difficult at all      PHQ-9 Total Score: 0      Appearance: Patient is a 24-year-old  male.  He is casually dressed in jeans, a short-sleeved gray T-shirt, and tennis shoes.  His dark brown hair is quite short.  He is clean shaven.  He is appropriately dressed for his age and the weather.  Gait, Station, Strength: WNL    Mental Status Exam:   Hygiene:   good  Cooperation:  Cooperative  Eye Contact:  Good  Psychomotor Behavior:  Restless  Affect: Appropriate   Mood: normal  Hopelessness: Denies  Speech:  Normal  Thought " Process:  Goal directed  Thought Content:  Normal  Suicidal:  None  Homicidal:  None  Hallucinations:  None  Delusion:  None  Memory:  Intact  Orientation:  Person, Place, Time, and Situation  Reliability:  good  Insight:  Fair  Judgement:  Fair  Impulse Control:  Good  Physical/Medical Issues:  Yes , see chart        Lab Results:   Office Visit on 09/18/2024   Component Date Value Ref Range Status    External Amphetamine Screen Urine 09/18/2024 Negative   Final    External Benzodiazepine Screen Uri* 09/18/2024 Negative   Final    External Cocaine Screen Urine 09/18/2024 Negative   Final    External THC Screen Urine 09/18/2024 Negative   Final    External Methadone Screen Urine 09/18/2024 Negative   Final    External Methamphetamine Screen Ur* 09/18/2024 Negative   Final    External Oxycodone Screen Urine 09/18/2024 Negative   Final    External Buprenorphine Screen Urine 09/18/2024 Negative   Final    External MDMA 09/18/2024 Negative   Final    External Opiates Screen Urine 09/18/2024 Negative   Final         Assessment & Plan   Diagnoses and all orders for this visit:    1. ADHD (attention deficit hyperactivity disorder), combined type (Primary)  -     methylphenidate (Concerta) 27 MG CR tablet; Take 1 tablet by mouth Daily for 30 days  Dispense: 30 tablet; Refill: 0        Visit Diagnoses:    ICD-10-CM ICD-9-CM   1. ADHD (attention deficit hyperactivity disorder), combined type  F90.2 314.01       GOALS:  Short Term Goals: Patient will be compliant with medication, and patient will have no significant medication related side effects.  Patient will be engaged in psychotherapy as indicated.  Patient will report subjective improvement of symptoms.  Long term goals: To stabilize mood and treat/improve subjective symptoms, the patient will stay out of the hospital, the patient will be at an optimal level of functioning, and the patient will take all medications as prescribed.  The patient/guardian verbalized  understanding and agreement with goals that were mutually set.         TREATMENT PLAN:  -Reviewed the results of patient's Chad CPT 3.   -Start methylphenidate (Concerta)  27 mg CR,, 1 tablet p.o. by mouth daily for ADHD combined type symptoms.  -discussed supportive psychotherapy efforts to develop coping skills to manage stress and emotions. Patient does not feel he needs counseling  -Pharmacological and Non-Pharmacological treatment options discussed during today's visit.   -Primary target symptoms for stimulant medication are concentration, attention span, motor hyperactivity, impulsiveness, physical and mental fatigue, daytime sleepiness and depression.    -The benefits of a healthy diet and exercise were discussed with patient, especially the positive effects they have on mental health. Patient encouraged to consider lifestyle modification regarding  diet and exercise patterns to maximize results of mental health treatment.   -We discussed sleep hygiene including going to bed at the same time and getting up at the same time every day, decreased caffeine consumption, going to bed early enough to get 7 or 8 hours in bed, reading and relaxing before bedtime, and avoiding stimulating activities close to bedtime.   -Patient acknowledged and verbally consented with current treatment plan and was educated on the importance of compliance with treatment and follow-up appointments.    -Return to clinic in 4 weeks for follow up.  -Reviewed previous available documentation  -Reviewed most recent available labs.  -ANGELICA reviewed  -Controlled Substance agreement signed.      MEDICATION ISSUES:  -Discussed medication options and treatment plan of prescribed medication as well as the risks, benefits, any black box warnings, and side effects.   -Medication options for treatment of ADHD discussed including Alpha 2 agonists, Strattera, Wellbutrin, Methylphenidate and Amphetamine options. Extensive education is provided  regarding stimulants. Class warnings for stimulants: worsening or new onset of tics, growth inhibition or weight loss, cardiovascular risks including palpitations, tachycardia, hypertension and sudden death in patient with preexisting cardiac structural abnormalities.. Patient is being prescribed a controlled substance as part of treatment plan. Stimulants can not be refilled or called in and patient is subject to random drug testing to ensure compliance with medication. Patient and guardian have been educated of appropriate use of the medications and potential side effects of: weight loss, anorexia, dry mouth, abdominal pain, nausea, vomiting, insomnia, headache, nervousness, and potential for dependence. Controlled substance agreement obtained.Patient is also informed that the medication are to be used by the patient only- avoid any combined use of ETOH or other substances unless prescribed. -Patient is agreeable to call the office with any worsening of symptoms or onset of side effects, or if any concerns or questions arise.    -The contact information for the office is made available to the patient. Patient is agreeable to call 911 or go to the nearest ER should they begin having any SI/HI, or if any urgent concerns arise. No medication side effects or related complaints today.   MEDS ORDERED DURING VISIT:    New Medications Ordered This Visit   Medications    methylphenidate (Concerta) 27 MG CR tablet     Sig: Take 1 tablet by mouth Daily for 30 days     Dispense:  30 tablet     Refill:  0       MEDS DISCONTINUED DURING VISIT:   There are no discontinued medications.     Follow Up Appointment:   No follow-ups on file.           This document has been electronically signed by ANTWAN Carrillo  October 16, 2024 13:06 EDT    Dictated Utilizing Dragon Dictation: Part of this note may be an electronic transcription/translation of spoken language to printed text using the Dragon Dictation System. Errors in  dictation may reflect use of voice recognition software and not all errors in transcription may have been detected prior to signing.

## 2024-12-10 ENCOUNTER — OFFICE VISIT (OUTPATIENT)
Dept: PSYCHIATRY | Facility: CLINIC | Age: 24
End: 2024-12-10
Payer: COMMERCIAL

## 2024-12-10 VITALS
HEART RATE: 89 BPM | HEIGHT: 63 IN | SYSTOLIC BLOOD PRESSURE: 124 MMHG | BODY MASS INDEX: 38.8 KG/M2 | OXYGEN SATURATION: 97 % | WEIGHT: 219 LBS | DIASTOLIC BLOOD PRESSURE: 76 MMHG

## 2024-12-10 DIAGNOSIS — F90.2 ADHD (ATTENTION DEFICIT HYPERACTIVITY DISORDER), COMBINED TYPE: Primary | ICD-10-CM

## 2024-12-10 DIAGNOSIS — Z79.899 MEDICATION MANAGEMENT: ICD-10-CM

## 2024-12-10 LAB
AMPHET+METHAMPHET UR QL: NEGATIVE
AMPHETAMINES UR QL: NEGATIVE
BARBITURATES UR QL SCN: NEGATIVE
BENZODIAZ UR QL SCN: NEGATIVE
BUPRENORPHINE SERPL-MCNC: NEGATIVE NG/ML
CANNABINOIDS SERPL QL: NEGATIVE
COCAINE UR QL: NEGATIVE
MDMA UR QL SCN: NEGATIVE
METHADONE UR QL SCN: NEGATIVE
MORPHINE/OPIATES SCREEN, URINE: NEGATIVE
OXYCODONE UR QL SCN: NEGATIVE
PCP UR QL SCN: NEGATIVE
PROPOXYPH UR QL SCN: NEGATIVE
TRICYCLICS UR QL SCN: NEGATIVE

## 2024-12-10 RX ORDER — MIDAZOLAM 5 MG/.1ML
SPRAY NASAL
COMMUNITY
Start: 2024-12-07

## 2024-12-10 RX ORDER — LEVETIRACETAM 1000 MG/1
1 TABLET ORAL EVERY 12 HOURS SCHEDULED
COMMUNITY
Start: 2024-10-30

## 2024-12-10 RX ORDER — METHYLPHENIDATE HYDROCHLORIDE 27 MG/1
27 TABLET ORAL DAILY
Qty: 30 TABLET | Refills: 0 | Status: SHIPPED | OUTPATIENT
Start: 2024-12-10 | End: 2025-01-09

## 2024-12-10 NOTE — PROGRESS NOTES
"  Subjective     Teo Velazquez is a 24 y.o. male who presents today for follow up    Chief Complaint: management of ADHD symptoms.     History of Present Illness:    Today is a follow-up visit.    Medication compliance: patient is NOT compliant with medications, denies SE.  Patient reports that he missed his last visit because his wife had a baby on 11/11/24. They have 7 children between them. They have been together for 4.5 years and the first child they have together.     He states he is out of his medication because he ran out of medication. When he was taking the medication he reports that it did help with focus and concentration. He was able to complete tasks without being distracted.     He states since his last visit he has signed up on disability. He was able to get back on his seizure medication and has not had one since then. Last year he had 7-8 seizures.     Details:  Depression is rated at 0/10, with 10 being the worst. PHQ-9 score: 0   Patient denies having a depressed mood or anhedonia. less, and guilty.      Anxiety is rated at 0/10, with 10 being the worst.   He denies having excessive anxiety, excessive worry, or difficulty controlling worry.      Patient reports having problems with hyperactivity, getting in trouble at school, he reports problems with focus and concentration. 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 Easily distracted, requiring small class room to improve ability to learn. He reports difficulty completing tasks. He did not graduate HS. He has some one to help him read and understand.       He denies having anxiety attacks  He denies having a problem with anger or irritability.      Sleep is good, getting about 8 hours a night. He reports feeling rested and \"fully charged\" Nightmares: Denies     Appetite is good. He reports eating 3 meals and snacking when hungry. He reports drinking 1-2 sodas a day, he drinks donna-aid, water and milk.      Patient denies " "SI/HI, A/V hallucinations, or delusions.    Alcohol use: no  Drug use: no  Marijuana use: no  Tobacco:  Started smoking at age 16, he is smoking a little less than 2 ppd.    Chronic health issues, no acute physical or medical issues today.  He has an appointment with neurology/seizure doctor for evaluation to get back on medication.  He has been waiting to make an appointment for disability after getting Doctors lined out.     The following portions of the patient's history were reviewed and updated as appropriate: allergies, current medications, past family history, past medical history, past social history, past surgical history and problem list.    Previous psychiatric medications include:   Antidepressants: None  Antianxiety: None  Antipsychotics: None  Mood stabilizers: Lamotrigine- patient does not remember taking this medication  ADHD: Concerta and Methylphenidate (Ritaline)    Past Psychiatric History:  Patient reports he was diagnosed with ADHD as a child. He reports getting treatment at [facility] and started medication at age 2. He stopped going to [facility] when he was 16. He went to another doctor and got his ADHD medication. He stopped taking medication [age] years old when he moved to [place] and did not know how to find a provider.   Dx with ADHD at 2/3 years old, started medication at age 2.   Outpatient treatment: [facility] and another place in [facility]   Diagnoses: ADHD, combined type. Depression    Past Medical History:  Past Medical History:   Diagnosis Date    ADHD     Hearing loss     Both ears    Learning disability     Seizures 2023    last seizure \"a long time ago, years.\"    Self-injurious behavior     \"I tried to cut myself for attention when I was a teenager.\"       Social History:  Social History     Socioeconomic History    Marital status: Single    Number of children: 3    Highest education level: 8th grade   Tobacco Use    Smoking status: Every Day     Current packs/day: 2.00     Average " packs/day: 2.0 packs/day for 0.5 years (1.0 ttl pk-yrs)     Types: Cigarettes    Smokeless tobacco: Former   Vaping Use    Vaping status: Never Used   Substance and Sexual Activity    Alcohol use: No    Drug use: No    Sexual activity: Yes     Partners: Female     Birth control/protection: None       Family History:  Family History   Problem Relation Age of Onset    Seizures Father     Seizures Sister     ADD / ADHD Sister     ODD Sister     Seizures Brother     ODD Brother     ADD / ADHD Brother     Seizures Paternal Grandmother        Past Surgical History:  Past Surgical History:   Procedure Laterality Date    NO PAST SURGERIES         Problem List:  Patient Active Problem List   Diagnosis    Depression with suicidal ideation       Allergy:   No Known Allergies     Current Medications:   Current Outpatient Medications   Medication Sig Dispense Refill    levETIRAcetam (KEPPRA) 1000 MG tablet Take 1 tablet by mouth Every 12 (Twelve) Hours.      methylphenidate (Concerta) 27 MG CR tablet Take 1 tablet by mouth Daily for 30 days 30 tablet 0    Nayzilam 5 MG/0.1ML solution        No current facility-administered medications for this visit.       Review of Symptoms:    Review of Systems   Constitutional: Negative.    HENT: Negative.     Eyes: Negative.    Respiratory: Negative.     Cardiovascular: Negative.    Gastrointestinal: Negative.    Endocrine: Negative.    Genitourinary: Negative.    Musculoskeletal: Negative.    Skin: Negative.    Allergic/Immunologic: Negative.    Neurological: Negative.    Hematological: Negative.    Psychiatric/Behavioral:  Positive for decreased concentration.        Objective     Physical Exam:   Physical Exam  Constitutional:       Appearance: Normal appearance.   Eyes:      Pupils: Pupils are equal, round, and reactive to light.   Cardiovascular:      Rate and Rhythm: Normal rate.   Pulmonary:      Effort: Pulmonary effort is normal.   Musculoskeletal:         General: Normal range of  "motion.      Cervical back: Normal range of motion.   Skin:     General: Skin is warm and dry.   Neurological:      General: No focal deficit present.      Mental Status: He is alert and oriented to person, place, and time.   Psychiatric:         Attention and Perception: Perception normal. He is inattentive.         Mood and Affect: Mood and affect normal.         Speech: Speech normal.         Behavior: Behavior is hyperactive. Behavior is cooperative.         Thought Content: Thought content normal.         Cognition and Memory: Cognition and memory normal.         Judgment: Judgment normal.         Vitals: manual radial pulse: 92 at 12:55  Blood pressure 124/76, pulse 89, height 160 cm (62.99\"), weight 99.3 kg (219 lb), SpO2 97%.   Body mass index is 38.8 kg/m².    Last 3 Blood Pressure and Pulse Readings:  BP Readings from Last 3 Encounters:   12/10/24 124/76   10/16/24 122/70   09/18/24 140/78     Pulse Readings from Last 3 Encounters:   12/10/24 89   10/16/24 111   09/18/24 105       PHQ-9 Score: December 10, 2024  Little interest or pleasure in doing things? Not at all   Feeling down, depressed, or hopeless? Not at all   PHQ-2 Total Score 0   Trouble falling or staying asleep, or sleeping too much? Not at all   Feeling tired or having little energy? Not at all   Poor appetite or overeating? Not at all   Feeling bad about yourself - or that you are a failure or have let yourself or your family down? Not at all   Trouble concentrating on things, such as reading the newspaper or watching television? Not at all   Moving or speaking so slowly that other people could have noticed? Or the opposite - being so fidgety or restless that you have been moving around a lot more than usual? Not at all   Thoughts that you would be better off dead, or of hurting yourself in some way? Not at all   PHQ-9 Total Score 0   If you checked off any problems, how difficult have these problems made it for you to do your work, take " care of things at home, or get along with other people? Not difficult at all         Appearance: Patient is a 24-year-old  male.  He is casually dressed in black pants, a hooded sweatshirt, and slides with socks. His dark brown hair is quite short.  He is clean shaven.  He is appropriately dressed for his age and the weather.  Gait, Station, Strength: WNL    Mental Status Exam:   Hygiene:   good  Cooperation:  Cooperative  Eye Contact:  Good  Psychomotor Behavior:  Restless  Affect: Appropriate   Mood: normal  Hopelessness: Denies  Speech:  Normal  Thought Process:  Goal directed  Thought Content:  Normal  Suicidal:  None  Homicidal:  None  Hallucinations:  None  Delusion:  None  Memory:  Intact  Orientation:  Person, Place, Time, and Situation  Reliability:  good  Insight:  Fair  Judgement:  Fair  Impulse Control:  Good  Physical/Medical Issues:  Yes , see chart        Lab Results:   Office Visit on 12/10/2024   Component Date Value Ref Range Status    Amphetamine Screen, Urine 12/10/2024 Negative  Negative Final    Preliminary results. Refer to confirmation send out from [facility] for final results.    Barbiturates Screen, Urine 12/10/2024 Negative  Negative Final    Buprenorphine, Screen, Urine 12/10/2024 Negative  Negative Final    Benzodiazepine Screen, Urine 12/10/2024 Negative  Negative Final    Cocaine Screen, Urine 12/10/2024 Negative  Negative Final    MDMA (ECSTASY) 12/10/2024 Negative  Negative Final    Methamphetamine, Ur 12/10/2024 Negative  Negative Final    Methadone Screen, Urine 12/10/2024 Negative  Negative Final    Morphine/Opiates Screen, Urine 12/10/2024 Negative  Negative Final    Oxycodone Screen, Urine 12/10/2024 Negative  Negative Final    Phencyclidine (PCP), Urine 12/10/2024 Negative  Negative Final    Propoxyphene Scree, Urine 12/10/2024 Negative  Negative Final    Tricyclic Antidepressants Screen 12/10/2024 Negative  Negative Final    THC, Screen, Urine 12/10/2024 Negative   Negative Final   Office Visit on 09/18/2024   Component Date Value Ref Range Status    External Amphetamine Screen Urine 09/18/2024 Negative   Final    External Benzodiazepine Screen [facility] 09/18/2024 Negative   Final    External Cocaine Screen Urine 09/18/2024 Negative   Final    External THC Screen Urine 09/18/2024 Negative   Final    External Methadone Screen Urine 09/18/2024 Negative   Final    External Methamphetamine Screen [facility]* 09/18/2024 Negative   Final    External Oxycodone Screen Urine 09/18/2024 Negative   Final    External Buprenorphine Screen Urine 09/18/2024 Negative   Final    External MDMA 09/18/2024 Negative   Final    External Opiates Screen Urine 09/18/2024 Negative   Final     Assessment & Plan   Diagnoses and all orders for this visit:    1. ADHD (attention deficit hyperactivity disorder), combined type (Primary)  -     methylphenidate (Concerta) 27 MG CR tablet; Take 1 tablet by mouth Daily for 30 days  Dispense: 30 tablet; Refill: 0    2. Medication management  -     POC Medline 14 Panel Urine Drug Screen        Visit Diagnoses:    ICD-10-CM ICD-9-CM   1. ADHD (attention deficit hyperactivity disorder), combined type  F90.2 314.01   2. Medication management  Z79.899 V58.69         GOALS:  Short Term Goals: Patient will be compliant with medication, and patient will have no significant medication related side effects.  Patient will be engaged in psychotherapy as indicated.  Patient will report subjective improvement of symptoms.  Long term goals: To stabilize mood and treat/improve subjective symptoms, the patient will stay out of the hospital, the patient will be at an optimal level of functioning, and the patient will take all medications as prescribed.  The patient/guardian verbalized understanding and agreement with goals that were mutually set.         TREATMENT PLAN:  -re-start methylphenidate (Concerta)  27 mg CR,, 1 tablet p.o. by mouth daily for ADHD combined type symptoms.  -Discussed  supportive psychotherapy efforts to develop coping skills to manage stress and emotions. Patient does not feel he needs counseling  -Pharmacological and Non-Pharmacological treatment options discussed during today's visit.   -Primary target symptoms for stimulant medication are concentration, attention span, motor hyperactivity, impulsiveness, physical and mental fatigue, daytime sleepiness and depression.    -The benefits of a healthy diet and exercise were discussed with patient, especially the positive effects they have on mental health. Patient encouraged to consider lifestyle modification regarding  diet and exercise patterns to maximize results of mental health treatment.   -We discussed sleep hygiene including going to bed at the same time and getting up at the same time every day, decreased caffeine consumption, going to bed early enough to get 7 or 8 hours in bed, reading and relaxing before bedtime, and avoiding stimulating activities close to bedtime.   -Patient acknowledged and verbally consented with current treatment plan and was educated on the importance of compliance with treatment and follow-up appointments.    -Return to clinic in 4 weeks for follow up.  -Reviewed previous available documentation  -Reviewed most recent available labs.  -ANGELICA reviewed  -Controlled Substance agreement signed,10/16/24     MEDICATION ISSUES:  -Discussed medication options and treatment plan of prescribed medication as well as the risks, benefits, any black box warnings, and side effects.   -Medication options for treatment of ADHD discussed including Alpha 2 agonists, Strattera, Wellbutrin, Methylphenidate and Amphetamine options. Extensive education is provided regarding stimulants. Class warnings for stimulants: worsening or new onset of tics, growth inhibition or weight loss, cardiovascular risks including palpitations, tachycardia, hypertension and sudden death in patient with preexisting cardiac structural  abnormalities.. Patient is being prescribed a controlled substance as part of treatment plan. Stimulants can not be refilled or called in and patient is subject to random drug testing to ensure compliance with medication. Patient and guardian have been educated of appropriate use of the medications and potential side effects of: weight loss, anorexia, dry mouth, abdominal pain, nausea, vomiting, insomnia, headache, nervousness, and potential for dependence. Controlled substance agreement obtained.Patient is also informed that the medication are to be used by the patient only- avoid any combined use of ETOH or other substances unless prescribed. -Patient is agreeable to call the office with any worsening of symptoms or onset of side effects, or if any concerns or questions arise.    -The contact information for the office is made available to the patient. Patient is agreeable to call 911 or go to the nearest ER should they begin having any SI/HI, or if any urgent concerns arise. No medication side effects or related complaints today.   MEDS ORDERED DURING VISIT:    New Medications Ordered This Visit   Medications    methylphenidate (Concerta) 27 MG CR tablet     Sig: Take 1 tablet by mouth Daily for 30 days     Dispense:  30 tablet     Refill:  0       MEDS DISCONTINUED DURING VISIT:   Medications Discontinued During This Encounter   Medication Reason    methylphenidate (Concerta) 27 MG CR tablet Reorder        Follow Up Appointment:   Return in about 4 weeks (around 1/7/2025) for Recheck.           This document has been electronically signed by ANTWAN Carrillo  December 10, 2024 10:54 EST    Dictated Utilizing Dragon Dictation: Part of this note may be an electronic transcription/translation of spoken language to printed text using the Dragon Dictation System. Errors in dictation may reflect use of voice recognition software and not all errors in transcription may have been detected prior to signing.

## 2025-01-13 DIAGNOSIS — F90.2 ADHD (ATTENTION DEFICIT HYPERACTIVITY DISORDER), COMBINED TYPE: ICD-10-CM

## 2025-01-13 RX ORDER — METHYLPHENIDATE HYDROCHLORIDE 27 MG/1
27 TABLET ORAL DAILY
Qty: 30 TABLET | Refills: 0 | Status: SHIPPED | OUTPATIENT
Start: 2025-01-13 | End: 2025-02-12

## 2025-01-28 ENCOUNTER — OFFICE VISIT (OUTPATIENT)
Dept: PSYCHIATRY | Facility: CLINIC | Age: 25
End: 2025-01-28
Payer: COMMERCIAL

## 2025-01-28 VITALS
SYSTOLIC BLOOD PRESSURE: 142 MMHG | OXYGEN SATURATION: 97 % | HEIGHT: 63 IN | WEIGHT: 224.2 LBS | BODY MASS INDEX: 39.73 KG/M2 | DIASTOLIC BLOOD PRESSURE: 80 MMHG | HEART RATE: 91 BPM

## 2025-01-28 DIAGNOSIS — F90.2 ADHD (ATTENTION DEFICIT HYPERACTIVITY DISORDER), COMBINED TYPE: Primary | ICD-10-CM

## 2025-01-28 RX ORDER — METHYLPHENIDATE HYDROCHLORIDE 27 MG/1
27 TABLET ORAL DAILY
Qty: 30 TABLET | Refills: 0 | Status: SHIPPED | OUTPATIENT
Start: 2025-01-28 | End: 2025-02-27

## 2025-01-28 NOTE — PROGRESS NOTES
"  Subjective     Teo Velazquez is a 24 y.o. male who presents today for follow up    Chief Complaint: management of ADHD symptoms.     History of Present Illness:    Today is a follow-up visit.    Medication compliance: patient is compliant with medications, denies SE.  Patient missed his last visit but called because his infant daughter was in the hosptial with rhinovirus. He and his wife have 7 children between them. They have been together for 4.5 years and the first child they have together.      When he was taking the medication he reports that it did help with focus and concentration. He was able to complete tasks without being distracted.     He states since his last visit he has signed up on disability-  he is still waiting on a response. He was able to get back on his seizure medication and has not had one since then. Last year he had 7-8 seizures.     Details:  Depression is rated at 0/10, with 10 being the worst. PHQ-9 score: 0   Patient denies having a depressed mood or anhedonia. less, and guilty.      Anxiety is rated at 0/10, with 10 being the worst.   He denies having excessive anxiety, excessive worry, or difficulty controlling worry.      Patient reports having problems with hyperactivity, getting in trouble at school, he reports problems with focus and concentration. 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 Easily distracted, requiring small class room to improve ability to learn. He reports difficulty completing tasks. He did not graduate HS. He has some one to help him read and understand.       He denies having anxiety attacks  He denies having a problem with anger or irritability.      Sleep is good, getting about 8 hours a night. He reports feeling rested and \"fully charged\" Nightmares: Denies     Appetite is good. He reports eating 3 meals and snacking when hungry. He reports drinking 1-2 sodas a day, he drinks donna-aid, water and milk.      Patient denies SI/HI, A/V " "hallucinations, or delusions.    Alcohol use: no  Drug use: no  Marijuana use: no  Tobacco:  Started smoking at age 16, he is smoking a little less than 2 ppd.    Chronic health issues, no acute physical or medical issues today.  He has an appointment with neurology/seizure doctor for evaluation to get back on medication.  He has been waiting to make an appointment for disability after getting Doctors lined out.     The following portions of the patient's history were reviewed and updated as appropriate: allergies, current medications, past family history, past medical history, past social history, past surgical history and problem list.    Previous psychiatric medications include:   Antidepressants: None  Antianxiety: None  Antipsychotics: None  Mood stabilizers: Lamotrigine- patient does not remember taking this medication  ADHD: Concerta and Methylphenidate (Ritaline)    Past Psychiatric History:  Patient has limited reading and writing.  Patient reports he was diagnosed with ADHD as a child. He reports getting treatment at [facility] and started medication at age 2. He stopped going to [facility] when he was 16. He went to another doctor and got his ADHD medication. He stopped taking medication 19/21 years old when he moved to [place] and did not know how to find a provider.   Dx with ADHD at 2/3 years old, started medication at age 2.   Outpatient treatment: [facility] and another place in [facility]   Diagnoses: ADHD, combined type. Depression    Past Medical History:  Past Medical History:   Diagnosis Date    ADHD     Hearing loss     Both ears    Learning disability     Seizures 2023    last seizure \"a long time ago, years.\"    Self-injurious behavior     \"I tried to cut myself for attention when I was a teenager.\"       Social History:  Social History     Socioeconomic History    Marital status: Single    Number of children: 3    Highest education level: 8th grade   Tobacco Use    Smoking status: Every Day     " Current packs/day: 2.00     Average packs/day: 2.0 packs/day for 0.5 years (1.0 ttl pk-yrs)     Types: Cigarettes    Smokeless tobacco: Former   Vaping Use    Vaping status: Never Used   Substance and Sexual Activity    Alcohol use: No    Drug use: No    Sexual activity: Yes     Partners: Female     Birth control/protection: None       Family History:  Family History   Problem Relation Age of Onset    Seizures Father     Seizures Sister     ADD / ADHD Sister     ODD Sister     Seizures Brother     ODD Brother     ADD / ADHD Brother     Seizures Paternal Grandmother        Past Surgical History:  Past Surgical History:   Procedure Laterality Date    NO PAST SURGERIES         Problem List:  Patient Active Problem List   Diagnosis    Depression with suicidal ideation       Allergy:   No Known Allergies     Current Medications:   Current Outpatient Medications   Medication Sig Dispense Refill    levETIRAcetam (KEPPRA) 1000 MG tablet Take 1 tablet by mouth Every 12 (Twelve) Hours.      methylphenidate (Concerta) 27 MG CR tablet Take 1 tablet by mouth Daily for 30 days 30 tablet 0    Nayzilam 5 MG/0.1ML solution        No current facility-administered medications for this visit.       Review of Symptoms:    Review of Systems   Constitutional: Negative.    HENT: Negative.     Eyes: Negative.    Respiratory: Negative.     Cardiovascular: Negative.    Gastrointestinal: Negative.    Endocrine: Negative.    Genitourinary: Negative.    Musculoskeletal: Negative.    Skin: Negative.    Allergic/Immunologic: Negative.    Neurological: Negative.    Hematological: Negative.    Psychiatric/Behavioral:  Positive for decreased concentration.        Objective     Physical Exam:   Physical Exam  Constitutional:       Appearance: Normal appearance.   Eyes:      Pupils: Pupils are equal, round, and reactive to light.   Cardiovascular:      Rate and Rhythm: Normal rate.   Pulmonary:      Effort: Pulmonary effort is normal.  "  Musculoskeletal:         General: Normal range of motion.      Cervical back: Normal range of motion.   Skin:     General: Skin is warm and dry.   Neurological:      General: No focal deficit present.      Mental Status: He is alert and oriented to person, place, and time.   Psychiatric:         Attention and Perception: Perception normal. He is inattentive.         Mood and Affect: Mood and affect normal.         Speech: Speech normal.         Behavior: Behavior is hyperactive. Behavior is cooperative.         Thought Content: Thought content normal.         Cognition and Memory: Cognition and memory normal.         Judgment: Judgment normal.         Vitals: manual radial pulse: 92 at 12:55  Blood pressure 142/80, pulse 91, height 160 cm (62.99\"), weight 102 kg (224 lb 3.2 oz), SpO2 97%.   Body mass index is 39.73 kg/m².    Last 3 Blood Pressure and Pulse Readings:  BP Readings from Last 3 Encounters:   01/28/25 142/80   12/10/24 124/76   10/16/24 122/70     Pulse Readings from Last 3 Encounters:   01/28/25 91   12/10/24 89   10/16/24 111       PHQ-9 Score: January 28, 2025  Little interest or pleasure in doing things? Not at all   Feeling down, depressed, or hopeless? Not at all   PHQ-2 Total Score 0   Trouble falling or staying asleep, or sleeping too much? Not at all   Feeling tired or having little energy? Not at all   Poor appetite or overeating? Not at all   Feeling bad about yourself - or that you are a failure or have let yourself or your family down? Not at all   Trouble concentrating on things, such as reading the newspaper or watching television? Almost all   Moving or speaking so slowly that other people could have noticed? Or the opposite - being so fidgety or restless that you have been moving around a lot more than usual? Not at all   Thoughts that you would be better off dead, or of hurting yourself in some way? Not at all   PHQ-9 Total Score 3   If you checked off any problems, how difficult have " these problems made it for you to do your work, take care of things at home, or get along with other people? Somewhat difficult         Appearance: Patient is a 24-year-old  male.  He is casually dressed in black pants, a black hooded sweatshirt, and slides with socks. His dark brown hair is clean but disheveled. He is clean shaven. He is appropriately dressed for his age and the weather.  Gait, Station, Strength: WNL    Mental Status Exam:   Hygiene:   good  Cooperation:  Cooperative  Eye Contact:  Good  Psychomotor Behavior:  Restless  Affect: Appropriate   Mood: normal  Hopelessness: Denies  Speech:  Normal  Thought Process:  Goal directed  Thought Content:  Normal  Suicidal:  None  Homicidal:  None  Hallucinations:  None  Delusion:  None  Memory:  Intact  Orientation:  Person, Place, Time, and Situation  Reliability:  good  Insight:  Fair  Judgement:  Fair  Impulse Control:  Good  Physical/Medical Issues:  Yes , see chart        Lab Results:   Office Visit on 12/10/2024   Component Date Value Ref Range Status    Amphetamine Screen, Urine 12/10/2024 Negative  Negative Final    Preliminary results. Refer to confirmation send out from [facility] for final results.    Barbiturates Screen, Urine 12/10/2024 Negative  Negative Final    Buprenorphine, Screen, Urine 12/10/2024 Negative  Negative Final    Benzodiazepine Screen, Urine 12/10/2024 Negative  Negative Final    Cocaine Screen, Urine 12/10/2024 Negative  Negative Final    MDMA (ECSTASY) 12/10/2024 Negative  Negative Final    Methamphetamine, Ur 12/10/2024 Negative  Negative Final    Methadone Screen, Urine 12/10/2024 Negative  Negative Final    Morphine/Opiates Screen, Urine 12/10/2024 Negative  Negative Final    Oxycodone Screen, Urine 12/10/2024 Negative  Negative Final    Phencyclidine (PCP), Urine 12/10/2024 Negative  Negative Final    Propoxyphene Scree, Urine 12/10/2024 Negative  Negative Final    Tricyclic Antidepressants Screen 12/10/2024 Negative   Negative Final    THC, Screen, Urine 12/10/2024 Negative  Negative Final     Assessment & Plan   Diagnoses and all orders for this visit:    1. ADHD (attention deficit hyperactivity disorder), combined type (Primary)  -     methylphenidate (Concerta) 27 MG CR tablet; Take 1 tablet by mouth Daily for 30 days  Dispense: 30 tablet; Refill: 0      Visit Diagnoses:    ICD-10-CM ICD-9-CM   1. ADHD (attention deficit hyperactivity disorder), combined type  F90.2 314.01       GOALS:  Short Term Goals: Patient will be compliant with medication, and patient will have no significant medication related side effects.  Patient will be engaged in psychotherapy as indicated.  Patient will report subjective improvement of symptoms.  Long term goals: To stabilize mood and treat/improve subjective symptoms, the patient will stay out of the hospital, the patient will be at an optimal level of functioning, and the patient will take all medications as prescribed.  The patient/guardian verbalized understanding and agreement with goals that were mutually set.     TREATMENT PLAN:  -re-start methylphenidate (Concerta)  27 mg CR,, 1 tablet p.o. by mouth daily for ADHD combined type symptoms.  -Discussed supportive psychotherapy efforts to develop coping skills to manage stress and emotions. Patient does not feel he needs counseling  -Pharmacological and Non-Pharmacological treatment options discussed during today's visit.   -Primary target symptoms for stimulant medication are concentration, attention span, motor hyperactivity, impulsiveness, physical and mental fatigue, daytime sleepiness and depression.    -The benefits of a healthy diet and exercise were discussed with patient, especially the positive effects they have on mental health. Patient encouraged to consider lifestyle modification regarding  diet and exercise patterns to maximize results of mental health treatment.   -We discussed sleep hygiene including going to bed at the same  time and getting up at the same time every day, decreased caffeine consumption, going to bed early enough to get 7 or 8 hours in bed, reading and relaxing before bedtime, and avoiding stimulating activities close to bedtime.   -Patient acknowledged and verbally consented with current treatment plan and was educated on the importance of compliance with treatment and follow-up appointments.    -Return to clinic in 4 weeks for follow up.  -Reviewed previous available documentation  -Reviewed most recent available labs.  -ANGELICA reviewed  -Controlled Substance agreement signed,10/16/24     MEDICATION ISSUES:  -Discussed medication options and treatment plan of prescribed medication as well as the risks, benefits, any black box warnings, and side effects.   -Medication options for treatment of ADHD discussed including Alpha 2 agonists, Strattera, Wellbutrin, Methylphenidate and Amphetamine options. Extensive education is provided regarding stimulants. Class warnings for stimulants: worsening or new onset of tics, growth inhibition or weight loss, cardiovascular risks including palpitations, tachycardia, hypertension and sudden death in patient with preexisting cardiac structural abnormalities.. Patient is being prescribed a controlled substance as part of treatment plan. Stimulants can not be refilled or called in and patient is subject to random drug testing to ensure compliance with medication. Patient and guardian have been educated of appropriate use of the medications and potential side effects of: weight loss, anorexia, dry mouth, abdominal pain, nausea, vomiting, insomnia, headache, nervousness, and potential for dependence. Controlled substance agreement obtained.Patient is also informed that the medication are to be used by the patient only- avoid any combined use of ETOH or other substances unless prescribed. -Patient is agreeable to call the office with any worsening of symptoms or onset of side effects, or if  any concerns or questions arise.    -The contact information for the office is made available to the patient. Patient is agreeable to call 911 or go to the nearest ER should they begin having any SI/HI, or if any urgent concerns arise. No medication side effects or related complaints today.   MEDS ORDERED DURING VISIT:    New Medications Ordered This Visit   Medications    methylphenidate (Concerta) 27 MG CR tablet     Sig: Take 1 tablet by mouth Daily for 30 days     Dispense:  30 tablet     Refill:  0       MEDS DISCONTINUED DURING VISIT:   Medications Discontinued During This Encounter   Medication Reason    methylphenidate (Concerta) 27 MG CR tablet Reorder          Follow Up Appointment:   Return in about 8 weeks (around 3/25/2025) for Recheck.           This document has been electronically signed by ANTWAN Carrillo  January 28, 2025 10:48 EST    Dictated Utilizing Dragon Dictation: Part of this note may be an electronic transcription/translation of spoken language to printed text using the Dragon Dictation System. Errors in dictation may reflect use of voice recognition software and not all errors in transcription may have been detected prior to signing.    History of Present Illness

## 2025-03-03 DIAGNOSIS — F90.2 ADHD (ATTENTION DEFICIT HYPERACTIVITY DISORDER), COMBINED TYPE: ICD-10-CM

## 2025-03-03 RX ORDER — METHYLPHENIDATE HYDROCHLORIDE 27 MG/1
27 TABLET ORAL DAILY
Qty: 30 TABLET | Refills: 0 | Status: SHIPPED | OUTPATIENT
Start: 2025-03-03 | End: 2025-04-02

## 2025-04-10 ENCOUNTER — OFFICE VISIT (OUTPATIENT)
Dept: PSYCHIATRY | Facility: CLINIC | Age: 25
End: 2025-04-10
Payer: COMMERCIAL

## 2025-04-10 VITALS
BODY MASS INDEX: 39.73 KG/M2 | HEIGHT: 63 IN | DIASTOLIC BLOOD PRESSURE: 70 MMHG | WEIGHT: 224.2 LBS | SYSTOLIC BLOOD PRESSURE: 126 MMHG | HEART RATE: 110 BPM | OXYGEN SATURATION: 97 %

## 2025-04-10 DIAGNOSIS — Z79.899 MEDICATION MANAGEMENT: ICD-10-CM

## 2025-04-10 DIAGNOSIS — F90.2 ADHD (ATTENTION DEFICIT HYPERACTIVITY DISORDER), COMBINED TYPE: Primary | ICD-10-CM

## 2025-04-10 RX ORDER — METHYLPHENIDATE HYDROCHLORIDE 27 MG/1
27 TABLET ORAL DAILY
Qty: 30 TABLET | Refills: 0 | Status: SHIPPED | OUTPATIENT
Start: 2025-04-10 | End: 2025-05-10

## 2025-04-10 NOTE — PROGRESS NOTES
"  Subjective     Teo Velazquez is a 24 y.o. male who presents today for follow up    Chief Complaint: management of ADHD symptoms.     History of Present Illness:    Today is a follow-up visit.    Medication compliance: patient is compliant with medications, denies SE.  Patient missed his last visit but called because his infant daughter was in the hosptial with rhinovirus. He and his wife have 7 children between them. They have been together for 4.5 years and the first child they have together.     When he was taking the medication he reports that it did help with focus and concentration. He was able to complete tasks without being distracted.     He states since his last visit he has signed up on disability-  he is still waiting on a response. He was able to get back on his seizure medication and has not had one since then. Last year he had 7-8 seizures.     Details:  Depression is rated at 0/10, with 10 being the worst. PHQ-9 score: 3 (prior 0)   Patient denies having a depressed mood or anhedonia. less, and guilty.      Anxiety is rated at 0/10, with 10 being the worst.   He denies having excessive anxiety, excessive worry, or difficulty controlling worry.      Patient reports having problems with hyperactivity, getting in trouble at school, he reports problems with focus and concentration. 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 Easily distracted, requiring small class room to improve ability to learn. He reports difficulty completing tasks. He did not graduate HS. He has some one to help him read and understand.       He denies having anxiety attacks  He denies having a problem with anger or irritability.      Sleep is good, getting about 8 hours a night. He reports feeling rested and \"fully charged\" Nightmares: Denies     Appetite is good. He reports eating 3 meals and snacking when hungry. He reports drinking 1-2 sodas a day, he drinks donna-aid, water and milk.      Patient denies " "SI/HI, A/V hallucinations, or delusions.    Alcohol use: no  Drug use: no  Marijuana use: no  Tobacco:  Started smoking at age 16, he is smoking a little less than 2 ppd.    Chronic health issues, no acute physical or medical issues today.  He has an appointment with neurology/seizure doctor for evaluation to get back on medication.  He has been waiting to make an appointment for disability after getting Doctors lined out.     The following portions of the patient's history were reviewed and updated as appropriate: allergies, current medications, past family history, past medical history, past social history, past surgical history and problem list.    Previous psychiatric medications include:   Antidepressants: None  Antianxiety: None  Antipsychotics: None  Mood stabilizers: Lamotrigine- patient does not remember taking this medication  ADHD: Concerta and Methylphenidate (Ritaline)    Past Psychiatric History:  Patient has limited reading and writing.  Patient reports he was diagnosed with ADHD as a child. He reports getting treatment at [facility] and started medication at age 2. He stopped going to [facility] when he was 16. He went to another doctor and got his ADHD medication. He stopped taking medication [age] years old when he moved to [place] and did not know how to find a provider.   Dx with ADHD at 2/3 years old, started medication at age 2.   Outpatient treatment: [facility] and another place in [facility]   Diagnoses: ADHD, combined type. Depression    Past Medical History:  Past Medical History:   Diagnosis Date    ADHD     Hearing loss     Both ears    Learning disability     Seizures 2023    last seizure \"a long time ago, years.\"    Self-injurious behavior     \"I tried to cut myself for attention when I was a teenager.\"       Social History:  Social History     Socioeconomic History    Marital status: Single    Number of children: 3    Highest education level: 8th grade   Tobacco Use    Smoking status: Every Day "     Current packs/day: 2.00     Average packs/day: 2.0 packs/day for 0.5 years (1.0 ttl pk-yrs)     Types: Cigarettes    Smokeless tobacco: Former   Vaping Use    Vaping status: Never Used   Substance and Sexual Activity    Alcohol use: No    Drug use: No    Sexual activity: Yes     Partners: Female     Birth control/protection: None       Family History:  Family History   Problem Relation Age of Onset    Seizures Father     Seizures Sister     ADD / ADHD Sister     ODD Sister     Seizures Brother     ODD Brother     ADD / ADHD Brother     Seizures Paternal Grandmother        Past Surgical History:  Past Surgical History:   Procedure Laterality Date    NO PAST SURGERIES         Problem List:  Patient Active Problem List   Diagnosis    Depression with suicidal ideation       Allergy:   No Known Allergies     Current Medications:   Current Outpatient Medications   Medication Sig Dispense Refill    levETIRAcetam (KEPPRA) 1000 MG tablet Take 1 tablet by mouth Every 12 (Twelve) Hours.      methylphenidate (Concerta) 27 MG CR tablet Take 1 tablet by mouth Daily for 30 days 30 tablet 0    Nayzilam 5 MG/0.1ML solution        No current facility-administered medications for this visit.       Review of Symptoms:    Review of Systems   Constitutional: Negative.    HENT: Negative.     Eyes: Negative.    Respiratory: Negative.     Cardiovascular: Negative.    Gastrointestinal: Negative.    Endocrine: Negative.    Genitourinary: Negative.    Musculoskeletal: Negative.    Skin: Negative.    Allergic/Immunologic: Negative.    Neurological: Negative.    Hematological: Negative.    Psychiatric/Behavioral:  Positive for decreased concentration.        Objective     Physical Exam:   Physical Exam  Constitutional:       Appearance: Normal appearance.   Eyes:      Pupils: Pupils are equal, round, and reactive to light.   Cardiovascular:      Rate and Rhythm: Tachycardia present.   Pulmonary:      Effort: Pulmonary effort is normal.  "  Musculoskeletal:         General: Normal range of motion.      Cervical back: Normal range of motion.   Skin:     General: Skin is warm and dry.   Neurological:      General: No focal deficit present.      Mental Status: He is alert and oriented to person, place, and time.   Psychiatric:         Attention and Perception: Perception normal. He is inattentive.         Mood and Affect: Mood and affect normal.         Speech: Speech normal.         Behavior: Behavior is hyperactive. Behavior is cooperative.         Thought Content: Thought content normal.         Cognition and Memory: Cognition and memory normal.         Judgment: Judgment normal.       Vitals: manual radial pulse: 92 at 12:55  Blood pressure 126/70, pulse 110, height 160 cm (62.99\"), weight 102 kg (224 lb 3.2 oz), SpO2 97%.   Body mass index is 39.73 kg/m².    Last 3 Blood Pressure and Pulse Readings:  BP Readings from Last 3 Encounters:   04/10/25 126/70   01/28/25 142/80   12/10/24 124/76     Pulse Readings from Last 3 Encounters:   04/10/25 110   01/28/25 91   12/10/24 89       PHQ-9 Score: April 10, 2025  Little interest or pleasure in doing things? Not at all   Feeling down, depressed, or hopeless? Not at all   PHQ-2 Total Score 0   Trouble falling or staying asleep, or sleeping too much? Not at all   Feeling tired or having little energy? Not at all   Poor appetite or overeating? Not at all   Feeling bad about yourself - or that you are a failure or have let yourself or your family down? Not at all   Trouble concentrating on things, such as reading the newspaper or watching television? Nearly every day   Moving or speaking so slowly that other people could have noticed? Or the opposite - being so fidgety or restless that you have been moving around a lot more than usual? Not at all     Thoughts that you would be better off dead, or of hurting yourself in some way? Not at all   PHQ-9 Total Score 3   If you checked off any problems, how difficult " have these problems made it for you to do your work, take care of things at home, or get along with other people? Not difficult at all           Appearance: Patient is a 24-year-old  male.  He is casually dressed in navy blue sweats, a red deja shirt, and slides with socks. His dark brown hair is clean but disheveled. He is clean shaven. He is appropriately dressed for his age and the weather.  Gait, Station, Strength: WNL    Mental Status Exam:   Hygiene:   good  Cooperation:  Cooperative  Eye Contact:  Good  Psychomotor Behavior:  Restless  Affect: Appropriate   Mood: normal  Hopelessness: Denies  Speech:  Normal  Thought Process:  Goal directed  Thought Content:  Normal  Suicidal:  None  Homicidal:  None  Hallucinations:  None  Delusion:  None  Memory:  Intact  Orientation:  Person, Place, Time, and Situation  Reliability:  good  Insight:  Fair  Judgement:  Fair  Impulse Control:  Good  Physical/Medical Issues:  Yes , see chart        Lab Results:   Office Visit on 04/10/2025   Component Date Value Ref Range Status    Amphetamine Screen, Urine 04/10/2025 Negative  Negative Final    Barbiturates Screen, Urine 04/10/2025 Negative  Negative Final    Buprenorphine, Screen, Urine 04/10/2025 Negative  Negative Final    Benzodiazepine Screen, Urine 04/10/2025 Negative  Negative Final    Cocaine Screen, Urine 04/10/2025 Negative  Negative Final    MDMA (ECSTASY) 04/10/2025 Negative  Negative Final    Methamphetamine, Ur 04/10/2025 Negative  Negative Final    Methadone Screen, Urine 04/10/2025 Negative  Negative Final    Morphine/Opiates Screen, Urine 04/10/2025 Negative  Negative Final    Oxycodone Screen, Urine 04/10/2025 Negative  Negative Final    Phencyclidine (PCP), Urine 04/10/2025 Negative  Negative Final    Propoxyphene Scree, Urine 04/10/2025 Negative  Negative Final    Tricyclic Antidepressants Screen 04/10/2025 Negative  Negative Final    THC, Screen, Urine 04/10/2025 Negative  Negative Final      Assessment & Plan   Diagnoses and all orders for this visit:    1. ADHD (attention deficit hyperactivity disorder), combined type (Primary)  -     methylphenidate (Concerta) 27 MG CR tablet; Take 1 tablet by mouth Daily for 30 days  Dispense: 30 tablet; Refill: 0    2. Medication management  -     POC Medline 14 Panel Urine Drug Screen  -     Behavioral Health Full Screen and Definitive Testing (JOSE) - Urine, Clean Catch; Future  -     Methylphenidate Definitive - Urine, Clean Catch; Future  -     Behavioral Health Full Screen and Definitive Testing (JOSE) - Urine, Clean Catch  -     Methylphenidate Definitive - Urine, Clean Catch      Visit Diagnoses:    ICD-10-CM ICD-9-CM   1. ADHD (attention deficit hyperactivity disorder), combined type  F90.2 314.01   2. Medication management  Z79.899 V58.69       GOALS:  Short Term Goals: Patient will be compliant with medication, and patient will have no significant medication related side effects.  Patient will be engaged in psychotherapy as indicated.  Patient will report subjective improvement of symptoms.  Long term goals: To stabilize mood and treat/improve subjective symptoms, the patient will stay out of the hospital, the patient will be at an optimal level of functioning, and the patient will take all medications as prescribed.  The patient/guardian verbalized understanding and agreement with goals that were mutually set.     TREATMENT PLAN:  -continue methylphenidate (Concerta)  27 mg CR,, 1 tablet p.o. by mouth daily for ADHD combined type symptoms.  -Discussed supportive psychotherapy efforts to develop coping skills to manage stress and emotions. Patient does not feel he needs counseling  -Pharmacological and Non-Pharmacological treatment options discussed during today's visit.   -Primary target symptoms for stimulant medication are concentration, attention span, motor hyperactivity, impulsiveness, physical and mental fatigue, daytime sleepiness and  depression.    -The benefits of a healthy diet and exercise were discussed with patient, especially the positive effects they have on mental health. Patient encouraged to consider lifestyle modification regarding  diet and exercise patterns to maximize results of mental health treatment.   -We discussed sleep hygiene including going to bed at the same time and getting up at the same time every day, decreased caffeine consumption, going to bed early enough to get 7 or 8 hours in bed, reading and relaxing before bedtime, and avoiding stimulating activities close to bedtime.   -Patient acknowledged and verbally consented with current treatment plan and was educated on the importance of compliance with treatment and follow-up appointments.    -Return to clinic in 12 weeks for follow up.  -Reviewed previous available documentation  -Reviewed most recent available labs.  -ANGELICA reviewed  -Controlled Substance agreement signed,10/16/24  -UDS: negative- pending confirmation.      MEDICATION ISSUES:  -Discussed medication options and treatment plan of prescribed medication as well as the risks, benefits, any black box warnings, and side effects.   -Medication options for treatment of ADHD discussed including Alpha 2 agonists, Strattera, Wellbutrin, Methylphenidate and Amphetamine options. Extensive education is provided regarding stimulants. Class warnings for stimulants: worsening or new onset of tics, growth inhibition or weight loss, cardiovascular risks including palpitations, tachycardia, hypertension and sudden death in patient with preexisting cardiac structural abnormalities.. Patient is being prescribed a controlled substance as part of treatment plan. Stimulants can not be refilled or called in and patient is subject to random drug testing to ensure compliance with medication. Patient and guardian have been educated of appropriate use of the medications and potential side effects of: weight loss, anorexia, dry  mouth, abdominal pain, nausea, vomiting, insomnia, headache, nervousness, and potential for dependence. Controlled substance agreement obtained.Patient is also informed that the medication are to be used by the patient only- avoid any combined use of ETOH or other substances unless prescribed. -Patient is agreeable to call the office with any worsening of symptoms or onset of side effects, or if any concerns or questions arise.    -The contact information for the office is made available to the patient. Patient is agreeable to call 911 or go to the nearest ER should they begin having any SI/HI, or if any urgent concerns arise. No medication side effects or related complaints today.   MEDS ORDERED DURING VISIT:    New Medications Ordered This Visit   Medications    methylphenidate (Concerta) 27 MG CR tablet     Sig: Take 1 tablet by mouth Daily for 30 days     Dispense:  30 tablet     Refill:  0       MEDS DISCONTINUED DURING VISIT:   Medications Discontinued During This Encounter   Medication Reason    methylphenidate (Concerta) 27 MG CR tablet Reorder         Follow Up Appointment:   No follow-ups on file.           This document has been electronically signed by ANTWAN Carrillo  April 10, 2025 16:31 EDT    Dictated Utilizing Dragon Dictation: Part of this note may be an electronic transcription/translation of spoken language to printed text using the Dragon Dictation System. Errors in dictation may reflect use of voice recognition software and not all errors in transcription may have been detected prior to signing.

## 2025-04-12 LAB — REF LAB TEST METHOD: NORMAL

## 2025-04-14 LAB
6-ACETYLMORPHINE: NOT DETECTED NG/ML
ALCOHOL, ETHYL: NOT DETECTED NG/ML
AMPHETAMINE: NOT DETECTED NG/ML
BENZODIAZ BLD QL: NOT DETECTED NG/ML
BUPRENORPHINE SAL CFM-MCNC: NOT DETECTED NG/ML
COCAINE METABOLITE: NOT DETECTED NG/ML
EDDP SERPL QL: NOT DETECTED NG/ML
FENTANYL-EIA: NOT DETECTED NG/ML
METHAMPHETAMINE: NOT DETECTED NG/ML
METHYLPHENIDATE: NOT DETECTED NG/ML
OPIATES: NOT DETECTED NG/ML
OXYCODONE: NOT DETECTED NG/ML
PCP: NOT DETECTED NG/ML
THC: NOT DETECTED NG/ML
TRICYCLIC ANTIDEPRESSANTS: NOT DETECTED NG/ML

## 2025-05-21 DIAGNOSIS — F90.2 ADHD (ATTENTION DEFICIT HYPERACTIVITY DISORDER), COMBINED TYPE: ICD-10-CM

## 2025-05-21 RX ORDER — METHYLPHENIDATE HYDROCHLORIDE 27 MG/1
27 TABLET ORAL DAILY
Qty: 30 TABLET | Refills: 0 | Status: SHIPPED | OUTPATIENT
Start: 2025-05-21 | End: 2025-06-20

## 2025-06-23 DIAGNOSIS — F90.2 ADHD (ATTENTION DEFICIT HYPERACTIVITY DISORDER), COMBINED TYPE: ICD-10-CM

## 2025-06-24 RX ORDER — METHYLPHENIDATE HYDROCHLORIDE 27 MG/1
27 TABLET ORAL DAILY
Qty: 30 TABLET | Refills: 0 | Status: SHIPPED | OUTPATIENT
Start: 2025-06-24 | End: 2025-07-24

## 2025-08-26 ENCOUNTER — TELEMEDICINE (OUTPATIENT)
Dept: PSYCHIATRY | Facility: CLINIC | Age: 25
End: 2025-08-26
Payer: COMMERCIAL

## 2025-08-26 DIAGNOSIS — F90.2 ADHD (ATTENTION DEFICIT HYPERACTIVITY DISORDER), COMBINED TYPE: Primary | ICD-10-CM

## 2025-08-26 RX ORDER — METHYLPHENIDATE HYDROCHLORIDE 27 MG/1
27 TABLET ORAL DAILY
Qty: 30 TABLET | Refills: 0 | Status: SHIPPED | OUTPATIENT
Start: 2025-08-26 | End: 2025-09-25